# Patient Record
Sex: MALE | Race: WHITE | Employment: FULL TIME | URBAN - METROPOLITAN AREA
[De-identification: names, ages, dates, MRNs, and addresses within clinical notes are randomized per-mention and may not be internally consistent; named-entity substitution may affect disease eponyms.]

---

## 2018-11-03 ENCOUNTER — APPOINTMENT (OUTPATIENT)
Dept: CT IMAGING | Age: 58
DRG: 246 | End: 2018-11-03
Payer: COMMERCIAL

## 2018-11-03 ENCOUNTER — APPOINTMENT (OUTPATIENT)
Dept: GENERAL RADIOLOGY | Age: 58
DRG: 246 | End: 2018-11-03
Payer: COMMERCIAL

## 2018-11-03 ENCOUNTER — HOSPITAL ENCOUNTER (INPATIENT)
Age: 58
LOS: 7 days | Discharge: HOME | DRG: 246 | End: 2018-11-10
Attending: EMERGENCY MEDICINE | Admitting: INTERNAL MEDICINE
Payer: COMMERCIAL

## 2018-11-03 DIAGNOSIS — I46.9 CARDIAC ARREST (HCC): Primary | ICD-10-CM

## 2018-11-03 PROBLEM — I49.01 VENTRICULAR FIBRILLATION (HCC): Status: ACTIVE | Noted: 2018-11-03

## 2018-11-03 LAB
ALLEN TEST: ABNORMAL
ALLEN TEST: POSITIVE
ANION GAP: 11 MMOL/L (ref 7–16)
FIO2: 100
FIO2: ABNORMAL
GFR NON-AFRICAN AMERICAN: 40 ML/MIN
GFR SERPL CREATININE-BSD FRML MDRD: 48 ML/MIN
GFR SERPL CREATININE-BSD FRML MDRD: ABNORMAL ML/MIN/{1.73_M2}
GLUCOSE BLD-MCNC: 134 MG/DL (ref 74–100)
HCO3 VENOUS: 31.4 MMOL/L (ref 22–29)
MODE: ABNORMAL
MODE: ABNORMAL
NEGATIVE BASE EXCESS, ART: 6 (ref 0–2)
NEGATIVE BASE EXCESS, VEN: 1 (ref 0–2)
O2 DEVICE/FLOW/%: ABNORMAL
O2 DEVICE/FLOW/%: ABNORMAL
O2 SAT, VEN: 4 % (ref 60–85)
PATIENT TEMP: ABNORMAL
PATIENT TEMP: ABNORMAL
PCO2, VEN: 87.9 MM HG (ref 41–51)
PH VENOUS: 7.16 (ref 7.32–7.43)
PO2, VEN: 7.4 MM HG (ref 30–50)
POC CHLORIDE: 105 MMOL/L (ref 98–107)
POC CREATININE: 1.78 MG/DL (ref 0.51–1.19)
POC HCO3: 25.4 MMOL/L (ref 21–28)
POC HEMATOCRIT: 53 % (ref 41–53)
POC HEMOGLOBIN: 17.9 G/DL (ref 13.5–17.5)
POC IONIZED CALCIUM: 1.24 MMOL/L (ref 1.15–1.33)
POC LACTIC ACID: 6.4 MMOL/L (ref 0.56–1.39)
POC O2 SATURATION: 69 % (ref 94–98)
POC PCO2 TEMP: ABNORMAL MM HG
POC PCO2 TEMP: ABNORMAL MM HG
POC PCO2: 79 MM HG (ref 35–48)
POC PH TEMP: ABNORMAL
POC PH TEMP: ABNORMAL
POC PH: 7.11 (ref 7.35–7.45)
POC PO2 TEMP: ABNORMAL MM HG
POC PO2 TEMP: ABNORMAL MM HG
POC PO2: 49.8 MM HG (ref 83–108)
POC POTASSIUM: 3 MMOL/L (ref 3.5–4.5)
POC SODIUM: 147 MMOL/L (ref 138–146)
POSITIVE BASE EXCESS, ART: ABNORMAL (ref 0–3)
POSITIVE BASE EXCESS, VEN: ABNORMAL (ref 0–3)
SAMPLE SITE: ABNORMAL
SAMPLE SITE: ABNORMAL
TCO2 (CALC), ART: 28 MMOL/L (ref 22–29)
TOTAL CO2, VENOUS: 34 MMOL/L (ref 23–30)

## 2018-11-03 PROCEDURE — 93005 ELECTROCARDIOGRAM TRACING: CPT

## 2018-11-03 PROCEDURE — 84484 ASSAY OF TROPONIN QUANT: CPT

## 2018-11-03 PROCEDURE — 71045 X-RAY EXAM CHEST 1 VIEW: CPT

## 2018-11-03 PROCEDURE — 31500 INSERT EMERGENCY AIRWAY: CPT

## 2018-11-03 PROCEDURE — 84132 ASSAY OF SERUM POTASSIUM: CPT

## 2018-11-03 PROCEDURE — 82947 ASSAY GLUCOSE BLOOD QUANT: CPT

## 2018-11-03 PROCEDURE — 92941 PRQ TRLML REVSC TOT OCCL AMI: CPT | Performed by: INTERNAL MEDICINE

## 2018-11-03 PROCEDURE — 71260 CT THORAX DX C+: CPT

## 2018-11-03 PROCEDURE — 027236Z DILATION OF CORONARY ARTERY, THREE ARTERIES WITH THREE DRUG-ELUTING INTRALUMINAL DEVICES, PERCUTANEOUS APPROACH: ICD-10-PCS | Performed by: INTERNAL MEDICINE

## 2018-11-03 PROCEDURE — C1751 CATH, INF, PER/CENT/MIDLINE: HCPCS

## 2018-11-03 PROCEDURE — 94770 HC ETCO2 MONITOR DAILY: CPT

## 2018-11-03 PROCEDURE — 36600 WITHDRAWAL OF ARTERIAL BLOOD: CPT

## 2018-11-03 PROCEDURE — 82330 ASSAY OF CALCIUM: CPT

## 2018-11-03 PROCEDURE — 93458 L HRT ARTERY/VENTRICLE ANGIO: CPT | Performed by: INTERNAL MEDICINE

## 2018-11-03 PROCEDURE — 6370000000 HC RX 637 (ALT 250 FOR IP): Performed by: EMERGENCY MEDICINE

## 2018-11-03 PROCEDURE — 82435 ASSAY OF BLOOD CHLORIDE: CPT

## 2018-11-03 PROCEDURE — 2720000010 HC SURG SUPPLY STERILE

## 2018-11-03 PROCEDURE — 84295 ASSAY OF SERUM SODIUM: CPT

## 2018-11-03 PROCEDURE — 6360000004 HC RX CONTRAST MEDICATION: Performed by: EMERGENCY MEDICINE

## 2018-11-03 PROCEDURE — 83605 ASSAY OF LACTIC ACID: CPT

## 2018-11-03 PROCEDURE — 94762 N-INVAS EAR/PLS OXIMTRY CONT: CPT

## 2018-11-03 PROCEDURE — C1894 INTRO/SHEATH, NON-LASER: HCPCS

## 2018-11-03 PROCEDURE — C1769 GUIDE WIRE: HCPCS

## 2018-11-03 PROCEDURE — 92929 HC PRQ CARD STENT W/ANGIO ADDL: CPT | Performed by: INTERNAL MEDICINE

## 2018-11-03 PROCEDURE — 2709999900 HC NON-CHARGEABLE SUPPLY

## 2018-11-03 PROCEDURE — 82803 BLOOD GASES ANY COMBINATION: CPT

## 2018-11-03 PROCEDURE — C1725 CATH, TRANSLUMIN NON-LASER: HCPCS

## 2018-11-03 PROCEDURE — 99285 EMERGENCY DEPT VISIT HI MDM: CPT

## 2018-11-03 PROCEDURE — 2500000003 HC RX 250 WO HCPCS

## 2018-11-03 PROCEDURE — 92950 HEART/LUNG RESUSCITATION CPR: CPT

## 2018-11-03 PROCEDURE — 2000000000 HC ICU R&B

## 2018-11-03 PROCEDURE — 05H333Z INSERTION OF INFUSION DEVICE INTO RIGHT INNOMINATE VEIN, PERCUTANEOUS APPROACH: ICD-10-PCS | Performed by: INTERNAL MEDICINE

## 2018-11-03 PROCEDURE — 85014 HEMATOCRIT: CPT

## 2018-11-03 PROCEDURE — C1874 STENT, COATED/COV W/DEL SYS: HCPCS

## 2018-11-03 PROCEDURE — 6360000004 HC RX CONTRAST MEDICATION

## 2018-11-03 PROCEDURE — 6360000002 HC RX W HCPCS

## 2018-11-03 PROCEDURE — 36556 INSERT NON-TUNNEL CV CATH: CPT | Performed by: INTERNAL MEDICINE

## 2018-11-03 PROCEDURE — 94002 VENT MGMT INPAT INIT DAY: CPT

## 2018-11-03 PROCEDURE — 82565 ASSAY OF CREATININE: CPT

## 2018-11-03 PROCEDURE — C1887 CATHETER, GUIDING: HCPCS

## 2018-11-03 PROCEDURE — 80053 COMPREHEN METABOLIC PANEL: CPT

## 2018-11-03 PROCEDURE — 85025 COMPLETE CBC W/AUTO DIFF WBC: CPT

## 2018-11-03 RX ORDER — HEPARIN SODIUM 10000 [USP'U]/100ML
INJECTION, SOLUTION INTRAVENOUS
Status: DISPENSED
Start: 2018-11-03 | End: 2018-11-04

## 2018-11-03 RX ORDER — KETAMINE HYDROCHLORIDE 10 MG/ML
INJECTION, SOLUTION INTRAMUSCULAR; INTRAVENOUS
Status: DISPENSED
Start: 2018-11-03 | End: 2018-11-04

## 2018-11-03 RX ORDER — HEPARIN SODIUM 1000 [USP'U]/ML
INJECTION, SOLUTION INTRAVENOUS; SUBCUTANEOUS
Status: DISPENSED
Start: 2018-11-03 | End: 2018-11-04

## 2018-11-03 RX ORDER — ASPIRIN 300 MG/1
300 SUPPOSITORY RECTAL ONCE
Status: COMPLETED | OUTPATIENT
Start: 2018-11-03 | End: 2018-11-03

## 2018-11-03 RX ADMIN — ASPIRIN 300 MG: 300 SUPPOSITORY RECTAL at 23:30

## 2018-11-03 RX ADMIN — IOPAMIDOL 75 ML: 755 INJECTION, SOLUTION INTRAVENOUS at 23:23

## 2018-11-04 ENCOUNTER — APPOINTMENT (OUTPATIENT)
Dept: GENERAL RADIOLOGY | Age: 58
DRG: 246 | End: 2018-11-04
Payer: COMMERCIAL

## 2018-11-04 ENCOUNTER — APPOINTMENT (OUTPATIENT)
Dept: ULTRASOUND IMAGING | Age: 58
DRG: 246 | End: 2018-11-04
Payer: COMMERCIAL

## 2018-11-04 LAB
-: NORMAL
ABSOLUTE EOS #: 0 K/UL (ref 0–0.4)
ABSOLUTE IMMATURE GRANULOCYTE: 0.5 K/UL (ref 0–0.3)
ABSOLUTE LYMPH #: 5.04 K/UL (ref 1–4.8)
ABSOLUTE MONO #: 0.29 K/UL (ref 0.1–0.8)
ACTIVATED CLOTTING TIME: 225 SEC (ref 79–149)
ALBUMIN SERPL-MCNC: 3.7 G/DL (ref 3.5–5.2)
ALBUMIN/GLOBULIN RATIO: 1.3 (ref 1–2.5)
ALLEN TEST: ABNORMAL
ALP BLD-CCNC: 96 U/L (ref 40–129)
ALT SERPL-CCNC: 427 U/L (ref 5–41)
AMORPHOUS: NORMAL
ANION GAP SERPL CALCULATED.3IONS-SCNC: 12 MMOL/L (ref 9–17)
ANION GAP SERPL CALCULATED.3IONS-SCNC: 14 MMOL/L (ref 9–17)
ANION GAP SERPL CALCULATED.3IONS-SCNC: 14 MMOL/L (ref 9–17)
ANION GAP SERPL CALCULATED.3IONS-SCNC: 27 MMOL/L (ref 9–17)
ANION GAP: 11 MMOL/L (ref 7–16)
ANION GAP: 8 MMOL/L (ref 7–16)
AST SERPL-CCNC: 315 U/L
BACTERIA: NORMAL
BASOPHILS # BLD: 0 % (ref 0–2)
BASOPHILS ABSOLUTE: 0 K/UL (ref 0–0.2)
BILIRUB SERPL-MCNC: 0.57 MG/DL (ref 0.3–1.2)
BILIRUBIN URINE: NEGATIVE
BUN BLDV-MCNC: 22 MG/DL (ref 6–20)
BUN BLDV-MCNC: 28 MG/DL (ref 6–20)
BUN BLDV-MCNC: 32 MG/DL (ref 6–20)
BUN BLDV-MCNC: 33 MG/DL (ref 6–20)
BUN/CREAT BLD: ABNORMAL (ref 9–20)
CALCIUM IONIZED: 1.13 MMOL/L (ref 1.13–1.33)
CALCIUM IONIZED: 1.16 MMOL/L (ref 1.13–1.33)
CALCIUM IONIZED: 1.2 MMOL/L (ref 1.13–1.33)
CALCIUM SERPL-MCNC: 10.2 MG/DL (ref 8.6–10.4)
CALCIUM SERPL-MCNC: 8.8 MG/DL (ref 8.6–10.4)
CALCIUM SERPL-MCNC: 8.9 MG/DL (ref 8.6–10.4)
CALCIUM SERPL-MCNC: 9 MG/DL (ref 8.6–10.4)
CASTS UA: NORMAL /LPF (ref 0–2)
CHLORIDE BLD-SCNC: 102 MMOL/L (ref 98–107)
CHLORIDE BLD-SCNC: 107 MMOL/L (ref 98–107)
CHLORIDE BLD-SCNC: 96 MMOL/L (ref 98–107)
CHLORIDE BLD-SCNC: 99 MMOL/L (ref 98–107)
CHOLESTEROL/HDL RATIO: 4.8
CHOLESTEROL: 192 MG/DL
CO2: 20 MMOL/L (ref 20–31)
CO2: 21 MMOL/L (ref 20–31)
CO2: 22 MMOL/L (ref 20–31)
CO2: 23 MMOL/L (ref 20–31)
COLOR: YELLOW
COMMENT UA: ABNORMAL
CREAT SERPL-MCNC: 1.55 MG/DL (ref 0.7–1.2)
CREAT SERPL-MCNC: 1.59 MG/DL (ref 0.7–1.2)
CREAT SERPL-MCNC: 1.71 MG/DL (ref 0.7–1.2)
CREAT SERPL-MCNC: 2.01 MG/DL (ref 0.7–1.2)
CRYSTALS, UA: NORMAL /HPF
DIFFERENTIAL TYPE: ABNORMAL
EOSINOPHILS RELATIVE PERCENT: 0 % (ref 1–4)
EPITHELIAL CELLS UA: NORMAL /HPF (ref 0–5)
FIO2: 100
FIO2: 50
FIO2: 80
FIO2: ABNORMAL
GFR AFRICAN AMERICAN: 42 ML/MIN
GFR AFRICAN AMERICAN: 50 ML/MIN
GFR AFRICAN AMERICAN: 55 ML/MIN
GFR AFRICAN AMERICAN: 56 ML/MIN
GFR NON-AFRICAN AMERICAN: 35 ML/MIN
GFR NON-AFRICAN AMERICAN: 41 ML/MIN
GFR NON-AFRICAN AMERICAN: 42 ML/MIN
GFR NON-AFRICAN AMERICAN: 45 ML/MIN
GFR NON-AFRICAN AMERICAN: 47 ML/MIN
GFR NON-AFRICAN AMERICAN: 47 ML/MIN
GFR SERPL CREATININE-BSD FRML MDRD: 50 ML/MIN
GFR SERPL CREATININE-BSD FRML MDRD: 57 ML/MIN
GFR SERPL CREATININE-BSD FRML MDRD: ABNORMAL ML/MIN/{1.73_M2}
GLUCOSE BLD-MCNC: 105 MG/DL (ref 75–110)
GLUCOSE BLD-MCNC: 118 MG/DL (ref 75–110)
GLUCOSE BLD-MCNC: 129 MG/DL (ref 75–110)
GLUCOSE BLD-MCNC: 137 MG/DL (ref 70–99)
GLUCOSE BLD-MCNC: 146 MG/DL (ref 75–110)
GLUCOSE BLD-MCNC: 148 MG/DL (ref 70–99)
GLUCOSE BLD-MCNC: 152 MG/DL (ref 74–100)
GLUCOSE BLD-MCNC: 153 MG/DL (ref 74–100)
GLUCOSE BLD-MCNC: 165 MG/DL (ref 74–100)
GLUCOSE BLD-MCNC: 166 MG/DL (ref 70–99)
GLUCOSE BLD-MCNC: 176 MG/DL (ref 74–100)
GLUCOSE BLD-MCNC: 243 MG/DL (ref 70–99)
GLUCOSE URINE: ABNORMAL
HCT VFR BLD CALC: 41.2 % (ref 40.7–50.3)
HCT VFR BLD CALC: 41.7 % (ref 40.7–50.3)
HCT VFR BLD CALC: 44.9 % (ref 40.7–50.3)
HCT VFR BLD CALC: 46.7 % (ref 40.7–50.3)
HDLC SERPL-MCNC: 40 MG/DL
HEMOGLOBIN: 13.6 G/DL (ref 13–17)
HEMOGLOBIN: 13.6 G/DL (ref 13–17)
HEMOGLOBIN: 14.5 G/DL (ref 13–17)
HEMOGLOBIN: 14.6 G/DL (ref 13–17)
IMMATURE GRANULOCYTES: 7 %
INR BLD: 1.1
KETONES, URINE: NEGATIVE
LACTIC ACID, WHOLE BLOOD: 1.2 MMOL/L (ref 0.7–2.1)
LACTIC ACID: NORMAL MMOL/L
LACTIC ACID: NORMAL MMOL/L
LDL CHOLESTEROL: 139 MG/DL (ref 0–130)
LEUKOCYTE ESTERASE, URINE: NEGATIVE
LV EF: 13 %
LVEF MODALITY: NORMAL
LYMPHOCYTES # BLD: 70 % (ref 24–44)
MAGNESIUM: 2.4 MG/DL (ref 1.6–2.6)
MAGNESIUM: 2.4 MG/DL (ref 1.6–2.6)
MAGNESIUM: 2.5 MG/DL (ref 1.6–2.6)
MAGNESIUM: 2.5 MG/DL (ref 1.6–2.6)
MAGNESIUM: 2.7 MG/DL (ref 1.6–2.6)
MCH RBC QN AUTO: 28.2 PG (ref 25.2–33.5)
MCH RBC QN AUTO: 28.9 PG (ref 25.2–33.5)
MCH RBC QN AUTO: 29 PG (ref 25.2–33.5)
MCHC RBC AUTO-ENTMCNC: 31.3 G/DL (ref 28.4–34.8)
MCHC RBC AUTO-ENTMCNC: 32.3 G/DL (ref 28.4–34.8)
MCHC RBC AUTO-ENTMCNC: 33 G/DL (ref 28.4–34.8)
MCV RBC AUTO: 87.2 FL (ref 82.6–102.9)
MCV RBC AUTO: 87.7 FL (ref 82.6–102.9)
MCV RBC AUTO: 92.8 FL (ref 82.6–102.9)
MODE: ABNORMAL
MONOCYTES # BLD: 4 % (ref 1–7)
MORPHOLOGY: NORMAL
MUCUS: NORMAL
NEGATIVE BASE EXCESS, ART: 1 (ref 0–2)
NEGATIVE BASE EXCESS, ART: 2 (ref 0–2)
NEGATIVE BASE EXCESS, ART: 3 (ref 0–2)
NEGATIVE BASE EXCESS, ART: 3 (ref 0–2)
NITRITE, URINE: NEGATIVE
NRBC AUTOMATED: 0 PER 100 WBC
NRBC AUTOMATED: 0 PER 100 WBC
NRBC AUTOMATED: 0.8 PER 100 WBC
O2 DEVICE/FLOW/%: ABNORMAL
OTHER OBSERVATIONS UA: NORMAL
PARTIAL THROMBOPLASTIN TIME: 90.5 SEC (ref 20.5–30.5)
PATIENT TEMP: 33.1
PATIENT TEMP: ABNORMAL
PDW BLD-RTO: 12.6 % (ref 11.8–14.4)
PDW BLD-RTO: 12.8 % (ref 11.8–14.4)
PDW BLD-RTO: 13.1 % (ref 11.8–14.4)
PH UA: 7.5 (ref 5–8)
PHOSPHORUS: 2.9 MG/DL (ref 2.5–4.5)
PHOSPHORUS: 2.9 MG/DL (ref 2.5–4.5)
PHOSPHORUS: 3.4 MG/DL (ref 2.5–4.5)
PHOSPHORUS: 3.7 MG/DL (ref 2.5–4.5)
PHOSPHORUS: 5.2 MG/DL (ref 2.5–4.5)
PLATELET # BLD: 204 K/UL (ref 138–453)
PLATELET # BLD: 222 K/UL (ref 138–453)
PLATELET # BLD: 247 K/UL (ref 138–453)
PLATELET ESTIMATE: ABNORMAL
PMV BLD AUTO: 10.5 FL (ref 8.1–13.5)
PMV BLD AUTO: 9.9 FL (ref 8.1–13.5)
PMV BLD AUTO: 9.9 FL (ref 8.1–13.5)
POC CHLORIDE: 103 MMOL/L (ref 98–107)
POC CHLORIDE: 104 MMOL/L (ref 98–107)
POC CREATININE: 1.53 MG/DL (ref 0.51–1.19)
POC CREATININE: 1.72 MG/DL (ref 0.51–1.19)
POC HCO3: 23.6 MMOL/L (ref 21–28)
POC HCO3: 24 MMOL/L (ref 21–28)
POC HCO3: 24.7 MMOL/L (ref 21–28)
POC HCO3: 24.7 MMOL/L (ref 21–28)
POC HCO3: 24.8 MMOL/L (ref 21–28)
POC HCO3: 25.3 MMOL/L (ref 21–28)
POC HEMATOCRIT: 40 % (ref 41–53)
POC HEMATOCRIT: 41 % (ref 41–53)
POC HEMOGLOBIN: 13.6 G/DL (ref 13.5–17.5)
POC HEMOGLOBIN: 14 G/DL (ref 13.5–17.5)
POC IONIZED CALCIUM: 1.18 MMOL/L (ref 1.15–1.33)
POC IONIZED CALCIUM: 1.24 MMOL/L (ref 1.15–1.33)
POC LACTIC ACID: 2.09 MMOL/L (ref 0.56–1.39)
POC LACTIC ACID: 2.48 MMOL/L (ref 0.56–1.39)
POC O2 SATURATION: 92 % (ref 94–98)
POC O2 SATURATION: 94 % (ref 94–98)
POC O2 SATURATION: 95 % (ref 94–98)
POC O2 SATURATION: 96 % (ref 94–98)
POC O2 SATURATION: 98 % (ref 94–98)
POC O2 SATURATION: 99 % (ref 94–98)
POC PCO2 TEMP: 39 MM HG
POC PCO2 TEMP: 42 MM HG
POC PCO2 TEMP: 42 MM HG
POC PCO2 TEMP: ABNORMAL MM HG
POC PCO2: 46 MM HG (ref 35–48)
POC PCO2: 46 MM HG (ref 35–48)
POC PCO2: 46.2 MM HG (ref 35–48)
POC PCO2: 49.1 MM HG (ref 35–48)
POC PCO2: 49.4 MM HG (ref 35–48)
POC PCO2: 49.6 MM HG (ref 35–48)
POC PH TEMP: 7.35
POC PH TEMP: 7.36
POC PH TEMP: 7.37
POC PH TEMP: ABNORMAL
POC PH: 7.29 (ref 7.35–7.45)
POC PH: 7.3 (ref 7.35–7.45)
POC PH: 7.31 (ref 7.35–7.45)
POC PH: 7.32 (ref 7.35–7.45)
POC PH: 7.34 (ref 7.35–7.45)
POC PH: 7.35 (ref 7.35–7.45)
POC PO2 TEMP: 127 MM HG
POC PO2 TEMP: 68 MM HG
POC PO2 TEMP: 85 MM HG
POC PO2 TEMP: ABNORMAL MM HG
POC PO2: 105.7 MM HG (ref 83–108)
POC PO2: 148.6 MM HG (ref 83–108)
POC PO2: 70.6 MM HG (ref 83–108)
POC PO2: 75.3 MM HG (ref 83–108)
POC PO2: 86 MM HG (ref 83–108)
POC PO2: 86.9 MM HG (ref 83–108)
POC POTASSIUM: 3.3 MMOL/L (ref 3.5–4.5)
POC POTASSIUM: 3.4 MMOL/L (ref 3.5–4.5)
POC SODIUM: 137 MMOL/L (ref 138–146)
POC SODIUM: 139 MMOL/L (ref 138–146)
POSITIVE BASE EXCESS, ART: ABNORMAL (ref 0–3)
POTASSIUM SERPL-SCNC: 3.3 MMOL/L (ref 3.7–5.3)
POTASSIUM SERPL-SCNC: 3.4 MMOL/L (ref 3.7–5.3)
POTASSIUM SERPL-SCNC: 3.4 MMOL/L (ref 3.7–5.3)
POTASSIUM SERPL-SCNC: 3.6 MMOL/L (ref 3.7–5.3)
POTASSIUM SERPL-SCNC: 3.9 MMOL/L (ref 3.7–5.3)
POTASSIUM SERPL-SCNC: 3.9 MMOL/L (ref 3.7–5.3)
PROTEIN UA: ABNORMAL
PROTHROMBIN TIME: 11.9 SEC (ref 9–12)
RBC # BLD: 4.7 M/UL (ref 4.21–5.77)
RBC # BLD: 5.03 M/UL (ref 4.21–5.77)
RBC # BLD: 5.15 M/UL (ref 4.21–5.77)
RBC # BLD: ABNORMAL 10*6/UL
RBC UA: NORMAL /HPF (ref 0–2)
RENAL EPITHELIAL, UA: NORMAL /HPF
SAMPLE SITE: ABNORMAL
SEG NEUTROPHILS: 19 % (ref 36–66)
SEGMENTED NEUTROPHILS ABSOLUTE COUNT: 1.37 K/UL (ref 1.8–7.7)
SODIUM BLD-SCNC: 136 MMOL/L (ref 135–144)
SODIUM BLD-SCNC: 138 MMOL/L (ref 135–144)
SODIUM BLD-SCNC: 140 MMOL/L (ref 135–144)
SODIUM BLD-SCNC: 143 MMOL/L (ref 135–144)
SPECIFIC GRAVITY UA: 1.03 (ref 1–1.03)
TCO2 (CALC), ART: 25 MMOL/L (ref 22–29)
TCO2 (CALC), ART: 26 MMOL/L (ref 22–29)
TCO2 (CALC), ART: 27 MMOL/L (ref 22–29)
TOTAL PROTEIN: 6.5 G/DL (ref 6.4–8.3)
TRICHOMONAS: NORMAL
TRIGL SERPL-MCNC: 67 MG/DL
TROPONIN INTERP: ABNORMAL
TROPONIN T: 0.05 NG/ML
TROPONIN T: 3.2 NG/ML
TROPONIN T: 4.66 NG/ML
TURBIDITY: ABNORMAL
URINE HGB: ABNORMAL
UROBILINOGEN, URINE: NORMAL
VLDLC SERPL CALC-MCNC: ABNORMAL MG/DL (ref 1–30)
WBC # BLD: 14.8 K/UL (ref 3.5–11.3)
WBC # BLD: 15.8 K/UL (ref 3.5–11.3)
WBC # BLD: 7.2 K/UL (ref 3.5–11.3)
WBC # BLD: ABNORMAL 10*3/UL
WBC UA: NORMAL /HPF (ref 0–5)
YEAST: NORMAL

## 2018-11-04 PROCEDURE — 82803 BLOOD GASES ANY COMBINATION: CPT

## 2018-11-04 PROCEDURE — 2580000003 HC RX 258: Performed by: INTERNAL MEDICINE

## 2018-11-04 PROCEDURE — 92941 PRQ TRLML REVSC TOT OCCL AMI: CPT | Performed by: INTERNAL MEDICINE

## 2018-11-04 PROCEDURE — 85347 COAGULATION TIME ACTIVATED: CPT

## 2018-11-04 PROCEDURE — 36556 INSERT NON-TUNNEL CV CATH: CPT | Performed by: INTERNAL MEDICINE

## 2018-11-04 PROCEDURE — 85018 HEMOGLOBIN: CPT

## 2018-11-04 PROCEDURE — 87086 URINE CULTURE/COLONY COUNT: CPT

## 2018-11-04 PROCEDURE — 85610 PROTHROMBIN TIME: CPT

## 2018-11-04 PROCEDURE — 6370000000 HC RX 637 (ALT 250 FOR IP): Performed by: INTERNAL MEDICINE

## 2018-11-04 PROCEDURE — 6360000002 HC RX W HCPCS: Performed by: INTERNAL MEDICINE

## 2018-11-04 PROCEDURE — 82565 ASSAY OF CREATININE: CPT

## 2018-11-04 PROCEDURE — 71045 X-RAY EXAM CHEST 1 VIEW: CPT

## 2018-11-04 PROCEDURE — 0BH18EZ INSERTION OF ENDOTRACHEAL AIRWAY INTO TRACHEA, VIA NATURAL OR ARTIFICIAL OPENING ENDOSCOPIC: ICD-10-PCS | Performed by: EMERGENCY MEDICINE

## 2018-11-04 PROCEDURE — C9113 INJ PANTOPRAZOLE SODIUM, VIA: HCPCS | Performed by: INTERNAL MEDICINE

## 2018-11-04 PROCEDURE — B2111ZZ FLUOROSCOPY OF MULTIPLE CORONARY ARTERIES USING LOW OSMOLAR CONTRAST: ICD-10-PCS | Performed by: INTERNAL MEDICINE

## 2018-11-04 PROCEDURE — 2720000010 HC SURG SUPPLY STERILE

## 2018-11-04 PROCEDURE — 83735 ASSAY OF MAGNESIUM: CPT

## 2018-11-04 PROCEDURE — 85014 HEMATOCRIT: CPT

## 2018-11-04 PROCEDURE — 83605 ASSAY OF LACTIC ACID: CPT

## 2018-11-04 PROCEDURE — 4A023N8 MEASUREMENT OF CARDIAC SAMPLING AND PRESSURE, BILATERAL, PERCUTANEOUS APPROACH: ICD-10-PCS | Performed by: INTERNAL MEDICINE

## 2018-11-04 PROCEDURE — 94770 HC ETCO2 MONITOR DAILY: CPT

## 2018-11-04 PROCEDURE — 84100 ASSAY OF PHOSPHORUS: CPT

## 2018-11-04 PROCEDURE — C1769 GUIDE WIRE: HCPCS

## 2018-11-04 PROCEDURE — 82330 ASSAY OF CALCIUM: CPT

## 2018-11-04 PROCEDURE — 85730 THROMBOPLASTIN TIME PARTIAL: CPT

## 2018-11-04 PROCEDURE — 74018 RADEX ABDOMEN 1 VIEW: CPT

## 2018-11-04 PROCEDURE — 89220 SPUTUM SPECIMEN COLLECTION: CPT

## 2018-11-04 PROCEDURE — 85027 COMPLETE CBC AUTOMATED: CPT

## 2018-11-04 PROCEDURE — 80048 BASIC METABOLIC PNL TOTAL CA: CPT

## 2018-11-04 PROCEDURE — 80061 LIPID PANEL: CPT

## 2018-11-04 PROCEDURE — 2700000000 HC OXYGEN THERAPY PER DAY

## 2018-11-04 PROCEDURE — 84300 ASSAY OF URINE SODIUM: CPT

## 2018-11-04 PROCEDURE — 6370000000 HC RX 637 (ALT 250 FOR IP)

## 2018-11-04 PROCEDURE — 84484 ASSAY OF TROPONIN QUANT: CPT

## 2018-11-04 PROCEDURE — 2500000003 HC RX 250 WO HCPCS: Performed by: INTERNAL MEDICINE

## 2018-11-04 PROCEDURE — 2000000000 HC ICU R&B

## 2018-11-04 PROCEDURE — 81001 URINALYSIS AUTO W/SCOPE: CPT

## 2018-11-04 PROCEDURE — 83935 ASSAY OF URINE OSMOLALITY: CPT

## 2018-11-04 PROCEDURE — 6360000002 HC RX W HCPCS

## 2018-11-04 PROCEDURE — 87070 CULTURE OTHR SPECIMN AEROBIC: CPT

## 2018-11-04 PROCEDURE — C8929 TTE W OR WO FOL WCON,DOPPLER: HCPCS

## 2018-11-04 PROCEDURE — 87205 SMEAR GRAM STAIN: CPT

## 2018-11-04 PROCEDURE — 92929 HC PRQ CARD STENT W/ANGIO ADDL: CPT | Performed by: INTERNAL MEDICINE

## 2018-11-04 PROCEDURE — 94003 VENT MGMT INPAT SUBQ DAY: CPT

## 2018-11-04 PROCEDURE — 84295 ASSAY OF SERUM SODIUM: CPT

## 2018-11-04 PROCEDURE — 93458 L HRT ARTERY/VENTRICLE ANGIO: CPT | Performed by: INTERNAL MEDICINE

## 2018-11-04 PROCEDURE — 84132 ASSAY OF SERUM POTASSIUM: CPT

## 2018-11-04 PROCEDURE — 82435 ASSAY OF BLOOD CHLORIDE: CPT

## 2018-11-04 PROCEDURE — C1874 STENT, COATED/COV W/DEL SYS: HCPCS

## 2018-11-04 PROCEDURE — 76770 US EXAM ABDO BACK WALL COMP: CPT

## 2018-11-04 PROCEDURE — 94762 N-INVAS EAR/PLS OXIMTRY CONT: CPT

## 2018-11-04 PROCEDURE — 82947 ASSAY GLUCOSE BLOOD QUANT: CPT

## 2018-11-04 PROCEDURE — B2151ZZ FLUOROSCOPY OF LEFT HEART USING LOW OSMOLAR CONTRAST: ICD-10-PCS | Performed by: INTERNAL MEDICINE

## 2018-11-04 PROCEDURE — 36415 COLL VENOUS BLD VENIPUNCTURE: CPT

## 2018-11-04 PROCEDURE — 2709999900 HC NON-CHARGEABLE SUPPLY

## 2018-11-04 PROCEDURE — 5A1945Z RESPIRATORY VENTILATION, 24-96 CONSECUTIVE HOURS: ICD-10-PCS | Performed by: EMERGENCY MEDICINE

## 2018-11-04 PROCEDURE — 99291 CRITICAL CARE FIRST HOUR: CPT | Performed by: INTERNAL MEDICINE

## 2018-11-04 PROCEDURE — 2500000003 HC RX 250 WO HCPCS

## 2018-11-04 PROCEDURE — 93005 ELECTROCARDIOGRAM TRACING: CPT

## 2018-11-04 RX ORDER — LOSARTAN POTASSIUM AND HYDROCHLOROTHIAZIDE 25; 100 MG/1; MG/1
1 TABLET ORAL DAILY
Status: ON HOLD | COMMUNITY
End: 2018-11-10 | Stop reason: HOSPADM

## 2018-11-04 RX ORDER — NICOTINE POLACRILEX 4 MG
15 LOZENGE BUCCAL PRN
Status: DISCONTINUED | OUTPATIENT
Start: 2018-11-04 | End: 2018-11-10 | Stop reason: HOSPADM

## 2018-11-04 RX ORDER — CARVEDILOL 25 MG/1
25 TABLET ORAL 2 TIMES DAILY WITH MEALS
COMMUNITY

## 2018-11-04 RX ORDER — SODIUM CHLORIDE 0.9 % (FLUSH) 0.9 %
10 SYRINGE (ML) INJECTION EVERY 12 HOURS SCHEDULED
Status: CANCELLED | OUTPATIENT
Start: 2018-11-04

## 2018-11-04 RX ORDER — DOCUSATE SODIUM 100 MG/1
100 CAPSULE, LIQUID FILLED ORAL 2 TIMES DAILY PRN
Status: DISCONTINUED | OUTPATIENT
Start: 2018-11-04 | End: 2018-11-10 | Stop reason: HOSPADM

## 2018-11-04 RX ORDER — SODIUM CHLORIDE 0.9 % (FLUSH) 0.9 %
10 SYRINGE (ML) INJECTION PRN
Status: CANCELLED | OUTPATIENT
Start: 2018-11-03

## 2018-11-04 RX ORDER — CHLORHEXIDINE GLUCONATE 0.12 MG/ML
15 RINSE ORAL 2 TIMES DAILY
Status: DISCONTINUED | OUTPATIENT
Start: 2018-11-04 | End: 2018-11-06

## 2018-11-04 RX ORDER — SODIUM CHLORIDE 0.9 % (FLUSH) 0.9 %
10 SYRINGE (ML) INJECTION PRN
Status: DISCONTINUED | OUTPATIENT
Start: 2018-11-04 | End: 2018-11-10 | Stop reason: HOSPADM

## 2018-11-04 RX ORDER — CYANOCOBALAMIN (VITAMIN B-12) 1000 MCG
1 TABLET ORAL DAILY
COMMUNITY
End: 2018-12-03

## 2018-11-04 RX ORDER — ONDANSETRON 2 MG/ML
4 INJECTION INTRAMUSCULAR; INTRAVENOUS EVERY 6 HOURS PRN
Status: DISCONTINUED | OUTPATIENT
Start: 2018-11-04 | End: 2018-11-10 | Stop reason: HOSPADM

## 2018-11-04 RX ORDER — UREA 10 %
800 LOTION (ML) TOPICAL DAILY
COMMUNITY
End: 2018-12-03

## 2018-11-04 RX ORDER — CHLORHEXIDINE GLUCONATE 0.12 MG/ML
15 RINSE ORAL 2 TIMES DAILY
Status: DISCONTINUED | OUTPATIENT
Start: 2018-11-04 | End: 2018-11-04

## 2018-11-04 RX ORDER — DEXTROSE MONOHYDRATE 50 MG/ML
100 INJECTION, SOLUTION INTRAVENOUS PRN
Status: DISCONTINUED | OUTPATIENT
Start: 2018-11-04 | End: 2018-11-10 | Stop reason: HOSPADM

## 2018-11-04 RX ORDER — PANTOPRAZOLE SODIUM 40 MG/10ML
40 INJECTION, POWDER, LYOPHILIZED, FOR SOLUTION INTRAVENOUS DAILY
Status: DISCONTINUED | OUTPATIENT
Start: 2018-11-04 | End: 2018-11-10 | Stop reason: HOSPADM

## 2018-11-04 RX ORDER — DEXTROSE MONOHYDRATE 25 G/50ML
12.5 INJECTION, SOLUTION INTRAVENOUS PRN
Status: DISCONTINUED | OUTPATIENT
Start: 2018-11-04 | End: 2018-11-10 | Stop reason: HOSPADM

## 2018-11-04 RX ORDER — PROPOFOL 10 MG/ML
10 INJECTION, EMULSION INTRAVENOUS
Status: DISCONTINUED | OUTPATIENT
Start: 2018-11-04 | End: 2018-11-06

## 2018-11-04 RX ORDER — DOCUSATE SODIUM 100 MG/1
100 CAPSULE, LIQUID FILLED ORAL 2 TIMES DAILY PRN
Status: CANCELLED | OUTPATIENT
Start: 2018-11-03

## 2018-11-04 RX ORDER — RIBOFLAVIN (VITAMIN B2) 100 MG
1 TABLET ORAL DAILY
COMMUNITY
End: 2018-12-03

## 2018-11-04 RX ORDER — ONDANSETRON 2 MG/ML
4 INJECTION INTRAMUSCULAR; INTRAVENOUS EVERY 6 HOURS PRN
Status: CANCELLED | OUTPATIENT
Start: 2018-11-03

## 2018-11-04 RX ORDER — ATORVASTATIN CALCIUM 80 MG/1
80 TABLET, FILM COATED ORAL NIGHTLY
Status: DISCONTINUED | OUTPATIENT
Start: 2018-11-04 | End: 2018-11-10 | Stop reason: HOSPADM

## 2018-11-04 RX ORDER — RANITIDINE 150 MG/1
150 TABLET ORAL 2 TIMES DAILY PRN
Status: ON HOLD | COMMUNITY
End: 2018-11-10 | Stop reason: HOSPADM

## 2018-11-04 RX ORDER — FENTANYL CITRATE 50 UG/ML
25 INJECTION, SOLUTION INTRAMUSCULAR; INTRAVENOUS
Status: DISCONTINUED | OUTPATIENT
Start: 2018-11-04 | End: 2018-11-10 | Stop reason: HOSPADM

## 2018-11-04 RX ORDER — SODIUM CHLORIDE 0.9 % (FLUSH) 0.9 %
10 SYRINGE (ML) INJECTION EVERY 12 HOURS SCHEDULED
Status: DISCONTINUED | OUTPATIENT
Start: 2018-11-04 | End: 2018-11-10 | Stop reason: HOSPADM

## 2018-11-04 RX ORDER — 0.9 % SODIUM CHLORIDE 0.9 %
10 VIAL (ML) INJECTION DAILY
Status: DISCONTINUED | OUTPATIENT
Start: 2018-11-04 | End: 2018-11-10 | Stop reason: HOSPADM

## 2018-11-04 RX ORDER — AMLODIPINE BESYLATE 10 MG/1
10 TABLET ORAL DAILY
COMMUNITY

## 2018-11-04 RX ORDER — ASPIRIN 81 MG/1
81 TABLET ORAL DAILY
COMMUNITY

## 2018-11-04 RX ORDER — SODIUM CHLORIDE 9 MG/ML
INJECTION, SOLUTION INTRAVENOUS CONTINUOUS
Status: DISCONTINUED | OUTPATIENT
Start: 2018-11-04 | End: 2018-11-06

## 2018-11-04 RX ORDER — MAGNESIUM SULFATE 1 G/100ML
1 INJECTION INTRAVENOUS PRN
Status: DISCONTINUED | OUTPATIENT
Start: 2018-11-04 | End: 2018-11-10 | Stop reason: HOSPADM

## 2018-11-04 RX ORDER — ACETAMINOPHEN 325 MG/1
650 TABLET ORAL EVERY 4 HOURS PRN
Status: CANCELLED | OUTPATIENT
Start: 2018-11-03

## 2018-11-04 RX ORDER — ASPIRIN 81 MG/1
81 TABLET, CHEWABLE ORAL DAILY
Status: DISCONTINUED | OUTPATIENT
Start: 2018-11-05 | End: 2018-11-10 | Stop reason: HOSPADM

## 2018-11-04 RX ORDER — POTASSIUM CHLORIDE 29.8 MG/ML
20 INJECTION INTRAVENOUS PRN
Status: DISCONTINUED | OUTPATIENT
Start: 2018-11-04 | End: 2018-11-06

## 2018-11-04 RX ORDER — PANTOPRAZOLE SODIUM 40 MG/1
40 TABLET, DELAYED RELEASE ORAL
Status: DISCONTINUED | OUTPATIENT
Start: 2018-11-04 | End: 2018-11-04

## 2018-11-04 RX ORDER — ACETAMINOPHEN 325 MG/1
650 TABLET ORAL EVERY 4 HOURS PRN
Status: DISCONTINUED | OUTPATIENT
Start: 2018-11-04 | End: 2018-11-10 | Stop reason: HOSPADM

## 2018-11-04 RX ADMIN — POTASSIUM CHLORIDE 20 MEQ: 29.8 INJECTION, SOLUTION INTRAVENOUS at 01:40

## 2018-11-04 RX ADMIN — FENTANYL CITRATE 25 MCG: 50 INJECTION INTRAMUSCULAR; INTRAVENOUS at 22:03

## 2018-11-04 RX ADMIN — SODIUM CHLORIDE: 9 INJECTION, SOLUTION INTRAVENOUS at 01:29

## 2018-11-04 RX ADMIN — Medication 4 MG/HR: at 03:44

## 2018-11-04 RX ADMIN — Medication 10 ML: at 19:54

## 2018-11-04 RX ADMIN — FENTANYL CITRATE 25 MCG: 50 INJECTION INTRAMUSCULAR; INTRAVENOUS at 04:02

## 2018-11-04 RX ADMIN — CHLORHEXIDINE GLUCONATE 0.12% ORAL RINSE 15 ML: 1.2 LIQUID ORAL at 08:28

## 2018-11-04 RX ADMIN — FENTANYL CITRATE 25 MCG: 50 INJECTION INTRAMUSCULAR; INTRAVENOUS at 14:14

## 2018-11-04 RX ADMIN — POTASSIUM CHLORIDE 20 MEQ: 29.8 INJECTION, SOLUTION INTRAVENOUS at 04:34

## 2018-11-04 RX ADMIN — Medication 10 ML: at 08:29

## 2018-11-04 RX ADMIN — CHLORHEXIDINE GLUCONATE 0.12% ORAL RINSE 15 ML: 1.2 LIQUID ORAL at 19:51

## 2018-11-04 RX ADMIN — FENTANYL CITRATE 25 MCG: 50 INJECTION INTRAMUSCULAR; INTRAVENOUS at 23:17

## 2018-11-04 RX ADMIN — POTASSIUM CHLORIDE 20 MEQ: 29.8 INJECTION, SOLUTION INTRAVENOUS at 08:53

## 2018-11-04 RX ADMIN — Medication 7 MG/HR: at 13:49

## 2018-11-04 RX ADMIN — HYDROCORTISONE SODIUM SUCCINATE 100 MG: 100 INJECTION, POWDER, FOR SOLUTION INTRAMUSCULAR; INTRAVENOUS at 03:57

## 2018-11-04 RX ADMIN — TICAGRELOR 90 MG: 90 TABLET ORAL at 08:28

## 2018-11-04 RX ADMIN — FENTANYL CITRATE 25 MCG: 50 INJECTION INTRAMUSCULAR; INTRAVENOUS at 01:24

## 2018-11-04 RX ADMIN — Medication 2 MCG/MIN: at 03:40

## 2018-11-04 RX ADMIN — PROPOFOL 20 MCG/KG/MIN: 10 INJECTION, EMULSION INTRAVENOUS at 00:30

## 2018-11-04 RX ADMIN — CHLORHEXIDINE GLUCONATE 0.12% ORAL RINSE 15 ML: 1.2 LIQUID ORAL at 01:40

## 2018-11-04 RX ADMIN — CISATRACURIUM BESYLATE 2 MCG/KG/MIN: 10 INJECTION, SOLUTION INTRAVENOUS at 01:20

## 2018-11-04 RX ADMIN — Medication 10 ML: at 09:00

## 2018-11-04 RX ADMIN — TICAGRELOR 90 MG: 90 TABLET ORAL at 19:51

## 2018-11-04 RX ADMIN — POTASSIUM CHLORIDE 20 MEQ: 29.8 INJECTION, SOLUTION INTRAVENOUS at 05:39

## 2018-11-04 RX ADMIN — INSULIN LISPRO 1 UNITS: 100 INJECTION, SOLUTION INTRAVENOUS; SUBCUTANEOUS at 08:35

## 2018-11-04 RX ADMIN — FENTANYL CITRATE 25 MCG: 50 INJECTION INTRAMUSCULAR; INTRAVENOUS at 17:46

## 2018-11-04 RX ADMIN — PANTOPRAZOLE SODIUM 40 MG: 40 INJECTION, POWDER, FOR SOLUTION INTRAVENOUS at 09:00

## 2018-11-04 RX ADMIN — POTASSIUM CHLORIDE 20 MEQ: 29.8 INJECTION, SOLUTION INTRAVENOUS at 18:00

## 2018-11-04 RX ADMIN — FENTANYL CITRATE 25 MCG: 50 INJECTION INTRAMUSCULAR; INTRAVENOUS at 09:18

## 2018-11-04 RX ADMIN — ATORVASTATIN CALCIUM 80 MG: 80 TABLET, FILM COATED ORAL at 19:51

## 2018-11-04 ASSESSMENT — PULMONARY FUNCTION TESTS
PIF_VALUE: 25
PIF_VALUE: 23
PIF_VALUE: 24
PIF_VALUE: 21
PIF_VALUE: 21
PIF_VALUE: 29
PIF_VALUE: 22
PIF_VALUE: 21

## 2018-11-04 NOTE — ED TRIAGE NOTES
Pt. Arrives to ED via LS 1 for  C/o v-fib arrest.  Pt. Was walking with friends from [employer] pt. Grabbed chest feeling SOB and fell to ground. Pt received 2 epi, 300mg of Amiodarone, 150mg of Amiodarone, 2gm Magnesium and 6 shocks PTA. Pt. Arrives intubated with dann airway.   Davila Gravel in place on arrival.

## 2018-11-04 NOTE — PLAN OF CARE
Problem: Nutrition  Goal: Optimal nutrition therapy  Outcome: Ongoing  Nutrition Problem: Inadequate oral intake  Intervention: Food and/or Nutrient Delivery: Continue NPO - If Tube Feedings to start, suggest Vital AF 1.2 (semi-elemental) goal 65-70 mL/hr. Nutritional Goals: Meet % of estimated nutrition needs.

## 2018-11-04 NOTE — SIGNIFICANT EVENT
03:24 am: Rapid response called. The patient is s/p V-fib, STEMI arrest on hypothermia protocol. He underwent left heart cath with stenting around 12 am. He was sedated with Propofol. He started becoming hypotensive, BP in 80s/50s; rapid was called. The Propofol drip was discontinued and Versed started instead. He was given fluids. Levophed was ordered but not given because his pressures improved to 100s/50s. Cardiology was called and agreed with these recommendations. BMP, Ca, mag, glucose labs were sent.         Remy Cloud MD  PGY-1, Internal medicine resident  [place], [place]

## 2018-11-04 NOTE — FLOWSHEET NOTE
met with family after procedure. They were waiting to be transferred to the first floor of [facility]. Patient is reported to have two 100% blockages. Family somewhat anxious regarding the waiting process over the next to days to assess if there was any permanent damage to the patient. Family discussed incident and processed emotions with . Anxiety reduced and family seems hopeful. 11/04/18 0108   Encounter Summary   Services provided to: Family   Referral/Consult From: [facility]   Support System Spouse; Children;Family members   Continue Visiting (11.4.2018)   Complexity of Encounter High   Length of Encounter 15 minutes   Routine   Type Post-procedure   Assessment Approachable; Hopeful;Coping   Intervention Active listening;Explored feelings, thoughts, concerns;Nurtured hope;Discussed illness/injury and it's impact   Outcome Expressed gratitude; Less anxious, less agitated;Engaged in conversation; Hopeful     Electronically signed by Keith Khan on 11/4/2018 at 1:15 AM

## 2018-11-04 NOTE — H&P
[facility] Cardiology Cardiology   History & Physical               Today's Date: 11/3/2018  Patient Name: Joel Pabon  Date of admission: 11/3/2018 10:33 PM  Patient's age: 66 y.o., 1960  Admission Dx: Cardiac arrest [facility]) [I46.9]    Reason for Admission:  Cardiac Arrest    CHIEF COMPLAINT:  Chest Pain  Chief Complaint   Patient presents with    Cardiac Arrest     pt. arrives LS1 v-fib arrest, CPR in progress, 2 epi, 6 shocks, 300mg and 150mg of amiodarone and 2g Mg PTA       History Obtained From:  electronic medical record, reason patient could not give history:  on ventilator and sedated. HISTORY OF PRESENT ILLNESS:      The patient is a 66 y.o.  male who is admitted to the hospital for cardiac arrest. The patient was walking out of the [facility] game when he was reportedly noted to be grabbing his chest, c/o shortness of breath before suddenly collapsing to the ground. Per report, bystander CPR began immediately and a Fawn Jetty Airway was put in. He was in v fib and required multiple shocks x 6, rounds of CPR and IV pushes including amiodarone. On arrival to the ED, he had more episodes of v fib requiring shock x 2 in addition to calcium and sodium bicarbonate after which ROSC was obtained. ECG showed injury pattern in the anteroseptal area. After intubation, there was persistent hypoxia and the decision was made by ED providers to divert the patient to CTa of the chest to r/o PE prior to transfer to the Cath lab. His oxygen saturations improved after increasing PEEP to 12. Initial ABG in the ED 7.11/79/50/25     In the ED, he received 324 mg of ASA and 4000 units of IV heparin. Past Medical History:   has a past medical history of Hypertension. Past Surgical History:   has no past surgical history on file. Home Medications:    Prior to Admission medications    Not on File         Allergies:  Patient has no allergy information on record.     Social History:        Family History: family history is not on file. REVIEW OF SYSTEMS:    Unobtainable due to mental status, intubated and sedated. PHYSICAL EXAM:      BP (!) 190/111   Pulse 73   Resp (!) 34   Wt (!) 330 lb (149.7 kg)   SpO2 (!) 31%    No intake or output data in the 24 hours ending 11/03/18 2351      Constitutional and General Appearance: intubated and sedated, moving some extremities, biting his ET tube. Eyes:  sclera anicteric, left eye normal, right eye normal  Mouth, Throat:  ET tube noted along with NG. Cardiovascular:  regular rate and rhythm, S1, S2 normal, no S3 or S4, no click and no rub  Respiratory:  b/l air entry present. Gastrointestinal:  soft, obese  Extremities: No calf assymetry. Lower extremity edema none bilateral  Musculoskeletal:  no joint tenderness, deformity or swelling, no muscular tenderness noted. 2 IO access on b/l shins. Skin:  warm and dry and anicteric  Neurologic:  not examined  Psychiatric:  Intubated and sedated. DATA:    Diagnostics:    EKG: SR with 1st degree AV block. Anteroseptal injury pattern. IVCD. CTa chest:   Negative CTA for pulmonary embolus.       NG tube extends into the airway terminated in the bronchus intermedius. Repositioning is recommended.  This finding was discussed with the patient's   nurse Ty Givens on 11/03/2018 at 2340 hours       Bilateral lung infiltrates questioning aspiration       Bilateral anterolateral rib fractures likely related to the CPR.     Left renal cyst       Gallstone       Labs:     CBC: No results for input(s): WBC, HGB, HCT, PLT in the last 72 hours. BMP:   Recent Labs      11/03/18   2234   CREATININE  1.78*   LABGLOM  40*     Pro-BNP:  No results for input(s): PROBNP in the last 72 hours. BNP: No results for input(s): BNP in the last 72 hours. PT/INR: No results for input(s): PROTIME, INR in the last 72 hours. APTT:No results for input(s): APTT in the last 72 hours.   CARDIAC ENZYMES:No results for input(s): CKTOTAL, CKMB, CKMBINDEX,

## 2018-11-04 NOTE — ED NOTES
Pt. Intubated at this  Time by Dr. Brisa Scott  Positive color change  Fog in tube  8.0 ETT tube 26 at the [facility]       Enedelia Bailey RN  11/03/18 9890

## 2018-11-04 NOTE — CONSULTS
Taking? Authorizing Provider   aspirin 81 MG EC tablet Take 81 mg by mouth daily   Yes Historical Provider, MD   Cyanocobalamin (B-12) 1000 MCG TABS Take 1 tablet by mouth daily   Yes Historical Provider, MD   Riboflavin (B-2) 100 MG TABS Take 1 tablet by mouth daily   Yes Historical Provider, MD   losartan-hydrochlorothiazide (HYZAAR) 100-25 MG per tablet Take 1 tablet by mouth daily   Yes Historical Provider, MD   folic acid (FOLVITE) 405 MCG tablet Take 800 mcg by mouth daily   Yes Historical Provider, MD   ranitidine (RANITIDINE 150 MAX STRENGTH) 150 MG tablet Take 150 mg by mouth 2 times daily as needed for Heartburn   Yes Historical Provider, MD   Ferrous Gluconate (IRON 27 PO) Take 1 tablet by mouth daily   Yes Historical Provider, MD   amLODIPine (NORVASC) 10 MG tablet Take 10 mg by mouth daily   Yes Historical Provider, MD   carvedilol (COREG) 25 MG tablet Take 25 mg by mouth 2 times daily (with meals)   Yes Historical Provider, MD     IMMUNIZATIONS:    IREVIEW OF SYSTEMS:  Not obtained due to patient being intubated and sedated     PHYSICAL EXAMINATION     VITAL SIGNS:   /86   Pulse 62   Temp (!) 91.6 °F (33.1 °C) Comment (Src): zoll  Resp 24   Ht 5' 11\" (1.803 m)   Wt (!) 330 lb 14.4 oz (150.1 kg)   SpO2 100%   BMI 46.15 kg/m²   SYSTEMIC EXAMINATION:   General:          Intubated and sedated. Neck:               No JVD, no thyromegaly, no lymphadenopathy. Chest:              Bilateral vesicular breath sounds, no rales or wheezes. Cardiac:           S1 S2 RR, no murmurs, gallops or rubs, JVP not raised. Abdomen:        Soft, non-tender, non distended, BS audible. :                  No suprapubic or flank tenderness. SKIN:               No rashes, good skin turgor.   Extremities:     No edema, No clubbing, No cyanosis  DATA REVIEW     Medications: Current Inpatient  Scheduled Meds:   sodium chloride flush  10 mL Intravenous 2 times per day    atorvastatin  80 mg Oral Nightly    [START

## 2018-11-04 NOTE — CONSULTS
N/A    Number of children: N/A    Years of education: N/A     Occupational History    manager      Social History Main Topics    Smoking status: Never Smoker    Smokeless tobacco: Former User     Quit date: 11/4/1993    Alcohol use 0.6 oz/week     1 Shots of liquor per week    Drug use: No    Sexual activity: Not on file     Other Topics Concern    Not on file     Social History Narrative    No narrative on file       Family History:        Family History   Problem Relation Age of Onset    Diabetes Mother     Heart Disease Mother        Outpatient Medications:     No prescriptions prior to admission. Current Medications:     Scheduled Meds:    sodium chloride flush  10 mL Intravenous 2 times per day    atorvastatin  80 mg Oral Nightly    [START ON 11/5/2018] aspirin  81 mg Oral Daily    ticagrelor  90 mg Oral BID    chlorhexidine  15 mL Mouth/Throat BID    insulin lispro  0-6 Units Subcutaneous 4x Daily AC & HS    insulin lispro  0-3 Units Subcutaneous Nightly    [START ON 11/5/2018] influenza virus vaccine  0.5 mL Intramuscular Once    pantoprazole  40 mg Intravenous Daily    And    sodium chloride (PF)  10 mL Intravenous Daily    ampicillin-sulbactam  1.5 g Intravenous Q6H     Continuous Infusions:    dextrose      propofol Stopped (11/04/18 0345)    cisatracurium (NIMBEX) infusion 0.75 mcg/kg/min (11/04/18 1016)    sodium chloride 50 mL/hr at 11/04/18 0129    norepinephrine 2 mcg/min (11/04/18 0340)    midazolam 7 mg/hr (11/04/18 1021)     PRN Meds:  sodium chloride flush, acetaminophen, magnesium hydroxide, docusate sodium, ondansetron, glucose, dextrose, glucagon (rDNA), dextrose, fentanNYL, AKWA TEARS, potassium chloride, magnesium sulfate, sodium phosphate IVPB **OR** sodium phosphate IVPB    Review of Systems:     Unable to assess given patient is sedated intubated    Input/Output:       I/O last 3 completed shifts:   In: 330 [I.V.:757]  Out: 1360 [Urine:1210; Emesis/NG

## 2018-11-04 NOTE — ED PROVIDER NOTES
[facility] ED  Emergency Department Encounter  EmergencyMedicine Resident     Pt Name:Bran Benitez  MRN: [id number]  [place] 1960  Date of evaluation: 11/3/18  PCP:  Ailyn Roque MD    CHIEF COMPLAINT       Chief Complaint   Patient presents with   [facility] Cardiac Arrest     pt. arrives LS1 v-fib arrest, CPR in progress, 2 epi, 6 shocks, 300mg and 150mg of amiodarone and 2g Mg PTA       HISTORY OF PRESENT ILLNESS  (Location/Symptom, Timing/Onset, Context/Setting, Quality, Duration, Modifying Factors, Severity.)      Lobito Dhillon is a 66 y.o. male who presents with Cardiac arrest.  Patient presents via EMS. Patient had a witnessed fall after complaining of shortness of breath and a cold sensation in his chest.  He does have a known history of hypertension. Patient was pulseless on EMS arrival CPR was begun immediately. On arrival to the emergency department CPR had been in progress for approximately 25 minutes. Patient had received 301 50 of amiodarone as well as to milligrams of epinephrine, 2 g Mag. He received 3-4 shocks for ventricular fibrillation. Marina Rashid device is in place on arrival giving compressions. Initial pulse check, patient is pulseless but there is an end tidal at 50. Patient is intubated with a Regan airway. Another round of CPR completed with 1 mg of epinephrine, 1 amp of bicarbonate as well as 1 g of calcium was given on arrival.  First pulse check did not reveal any pulses and CPR is continued. A second pulse check, patient did have a pulse. Patient was differently one more time in the emergency department. Please see nursing run sheet for full details. PAST MEDICAL / SURGICAL / SOCIAL / FAMILY HISTORY      has a past medical history of Hypertension. Social History     Social History    Marital status:      Spouse name: N/A    Number of children: N/A    Years of education: N/A     Occupational History    Not on file.      Social History Main REPORTED     RBC Morphology NOT REPORTED     Platelet Estimate NOT REPORTED    Venous Blood Gas, POC   Result Value Ref Range    pH, Johann 7.161 (LL) 7.320 - 7.430    pCO2, Johann 87.9 (HH) 41.0 - 51.0 mm Hg    pO2, Johann 7.4 (L) 30.0 - 50.0 mm Hg    HCO3, Venous 31.4 (H) 22.0 - 29.0 mmol/L    Total CO2, Venous 34 (H) 23.0 - 30.0 mmol/L    Negative Base Excess, Johann 1 0.0 - 2.0    Positive Base Excess, Johann NOT REPORTED 0.0 - 3.0    O2 Sat, Johann 4 (L) 60.0 - 85.0 %    O2 Device/Flow/% NOT REPORTED     Dwain Test NOT REPORTED     Sample Site NOT REPORTED     Mode NOT REPORTED     FIO2 NOT REPORTED     Pt Temp NOT REPORTED     POC pH Temp NOT REPORTED     POC pCO2 Temp NOT REPORTED mm Hg    POC pO2 Temp NOT REPORTED mm Hg   Creatinine W/GFR Point of Care   Result Value Ref Range    POC Creatinine 1.78 (H) 0.51 - 1.19 mg/dL    GFR Comment 48 (L) >60 mL/min    GFR Non-African American 40 (L) >60 mL/min    GFR Comment         Lactic Acid, POC   Result Value Ref Range    POC Lactic Acid 6.40 (H) 0.56 - 1.39 mmol/L   POCT Glucose   Result Value Ref Range    POC Glucose 134 (H) 74 - 100 mg/dL   Anion Gap (Calc) POC   Result Value Ref Range    Anion Gap 11 7 - 16 mmol/L   Arterial Blood Gas, POC   Result Value Ref Range    POC pH 7.114 (LL) 7.350 - 7.450    POC pCO2 79.0 (HH) 35.0 - 48.0 mm Hg    POC PO2 49.8 (LL) 83.0 - 108.0 mm Hg    POC HCO3 25.4 21.0 - 28.0 mmol/L    TCO2 (calc), Art 28 22.0 - 29.0 mmol/L    Negative Base Excess, Art 6 (H) 0.0 - 2.0    Positive Base Excess, Art NOT REPORTED 0.0 - 3.0    POC O2 SAT 69 (L) 94.0 - 98.0 %    O2 Device/Flow/% Adult Ventilator     Dwain Test POSITIVE     Sample Site Left Radial Artery     Mode NOT REPORTED     FIO2 100.0     Pt Temp NOT REPORTED     POC pH Temp NOT REPORTED     POC pCO2 Temp NOT REPORTED mm Hg    POC pO2 Temp NOT REPORTED mm Hg       IMPRESSION: Patient's cardiac arrest.  See above for full details.   He had Sebas Carrizales can then lost pulses again with returncirculation second time.  After the first Mikala Simpers, patient did become hypoxic. Patient was desaturating into the 50s and 60s. He was unclear as to the cause of this. ET tube was checked. Patient was intubated by Dr. Santo Sapp. End-tidal CO2 remained at 30 , however patient was pulseless and CPR was restarted. After epinephrine, patient did have ROSC for the second time. PEEP was increased from 5 to 12 and patient;s 02 sat increased with it. However due to hypoxia, patient was taken to CT scan to r/o PE. PE scan was grossly negative and patient was taken to cath lab. RADIOLOGY:  Xr Chest Portable    Result Date: 11/4/2018  NG tube malpositioned in the right lower lobe. Tube should be removed. Bilateral upper lobe infiltrates questioning aspiration. Ct Chest Pulmonary Embolism W Contrast    Result Date: 11/3/2018  Negative CTA for pulmonary embolus. NG tube extends into the airway terminated in the bronchus intermedius. Repositioning is recommended. This finding was discussed with the patient's nurse Cierra Mcgrath on 11/03/2018 at 2340 hours Bilateral lung infiltrates questioning aspiration Bilateral anterolateral rib fractures likely related to the CPR. Left renal cyst Gallstone       EKG  EKG Interpretation    Interpreted by me    Rhythm: normal sinus   Rate: normal  Axis: normal  Ectopy: none  Conduction: normal  ST Segments: > 1 mm elevation in leads v1, v2, v3  T Waves: non specific changes  Q Waves: present in leads v2, v3, v4    Clinical Impression:  Anterior-septal Myocardial Infarction - recent or probably acute    All EKG's are interpreted by the Emergency Department Physician who either signs or Co-signs this chart in the absence of a cardiologist.    EMERGENCY DEPARTMENT COURSE:  [id number]: STEMI paged  [id number]: Dr. Elizabeth Wharton called back  [phone]: Patient to cath lab    PROCEDURES:  None    CONSULTS:  IP CONSULT TO SOCIAL WORK    CRITICAL CARE:  None    FINAL IMPRESSION      1.  Cardiac arrest ([facility] [address]          DISPOSITION / PLAN

## 2018-11-04 NOTE — FLOWSHEET NOTE
Visited and prayed at patient's bedside. Patient was intubated and sedated at the time. Family was not present. Patient was raised Roman Catholic and was anointed after praying for him. Chaplains would continue to visit for on going assessment of patient's condition and for more spiritual and emotional support. 11/04/18 1040   Encounter Summary   Services provided to: Patient   Support System Family members   Continue Visiting (11/04/2018)   Complexity of Encounter High   Length of Encounter 15 minutes   Spiritual/Mu-ism   Type Spiritual support   Assessment Approachable;Calm   Intervention Prayer; Anointing   Sacraments   Sacrament of Sick-Anointing Anointed

## 2018-11-05 LAB
ABSOLUTE EOS #: <0.03 K/UL (ref 0–0.44)
ABSOLUTE IMMATURE GRANULOCYTE: 0.11 K/UL (ref 0–0.3)
ABSOLUTE LYMPH #: 0.87 K/UL (ref 1.1–3.7)
ABSOLUTE MONO #: 0.8 K/UL (ref 0.1–1.2)
ALBUMIN SERPL-MCNC: 3.9 G/DL (ref 3.5–5.2)
ALBUMIN/GLOBULIN RATIO: 1.2 (ref 1–2.5)
ALLEN TEST: ABNORMAL
ALP BLD-CCNC: 70 U/L (ref 40–129)
ALT SERPL-CCNC: 521 U/L (ref 5–41)
ANION GAP SERPL CALCULATED.3IONS-SCNC: 15 MMOL/L (ref 9–17)
ANION GAP SERPL CALCULATED.3IONS-SCNC: 16 MMOL/L (ref 9–17)
AST SERPL-CCNC: 268 U/L
BASOPHILS # BLD: 0 % (ref 0–2)
BASOPHILS ABSOLUTE: 0.05 K/UL (ref 0–0.2)
BILIRUB SERPL-MCNC: 1.15 MG/DL (ref 0.3–1.2)
BILIRUBIN DIRECT: <0.08 MG/DL
BILIRUBIN, INDIRECT: ABNORMAL MG/DL (ref 0–1)
BUN BLDV-MCNC: 36 MG/DL (ref 6–20)
BUN BLDV-MCNC: 40 MG/DL (ref 6–20)
BUN/CREAT BLD: ABNORMAL (ref 9–20)
BUN/CREAT BLD: ABNORMAL (ref 9–20)
CALCIUM IONIZED: 1.16 MMOL/L (ref 1.13–1.33)
CALCIUM IONIZED: 1.18 MMOL/L (ref 1.13–1.33)
CALCIUM SERPL-MCNC: 8.4 MG/DL (ref 8.6–10.4)
CALCIUM SERPL-MCNC: 8.9 MG/DL (ref 8.6–10.4)
CHLORIDE BLD-SCNC: 106 MMOL/L (ref 98–107)
CHLORIDE BLD-SCNC: 107 MMOL/L (ref 98–107)
CO2: 20 MMOL/L (ref 20–31)
CO2: 22 MMOL/L (ref 20–31)
COMPLEMENT C3: 121 MG/DL (ref 90–180)
COMPLEMENT C4: 26 MG/DL (ref 10–40)
CREAT SERPL-MCNC: 2.19 MG/DL (ref 0.7–1.2)
CREAT SERPL-MCNC: 2.97 MG/DL (ref 0.7–1.2)
CREATININE URINE: 93.8 MG/DL (ref 39–259)
CULTURE: NO GROWTH
DIFFERENTIAL TYPE: ABNORMAL
EOSINOPHILS RELATIVE PERCENT: 0 % (ref 1–4)
FIO2: 40
FREE KAPPA/LAMBDA RATIO: 2.63 (ref 0.26–1.65)
GFR AFRICAN AMERICAN: 27 ML/MIN
GFR AFRICAN AMERICAN: 38 ML/MIN
GFR NON-AFRICAN AMERICAN: 22 ML/MIN
GFR NON-AFRICAN AMERICAN: 31 ML/MIN
GFR SERPL CREATININE-BSD FRML MDRD: ABNORMAL ML/MIN/{1.73_M2}
GLOBULIN: ABNORMAL G/DL (ref 1.5–3.8)
GLUCOSE BLD-MCNC: 104 MG/DL (ref 75–110)
GLUCOSE BLD-MCNC: 107 MG/DL (ref 75–110)
GLUCOSE BLD-MCNC: 114 MG/DL (ref 75–110)
GLUCOSE BLD-MCNC: 118 MG/DL (ref 75–110)
GLUCOSE BLD-MCNC: 123 MG/DL (ref 70–99)
GLUCOSE BLD-MCNC: 134 MG/DL (ref 70–99)
GLUCOSE BLD-MCNC: 98 MG/DL (ref 75–110)
GLUCOSE BLD-MCNC: 99 MG/DL (ref 75–110)
HCT VFR BLD CALC: 45.4 % (ref 40.7–50.3)
HEMOGLOBIN: 15.4 G/DL (ref 13–17)
IMMATURE GRANULOCYTES: 1 %
INR BLD: 1
KAPPA FREE LIGHT CHAINS QNT: 3.34 MG/DL (ref 0.37–1.94)
LACTIC ACID, WHOLE BLOOD: 1 MMOL/L (ref 0.7–2.1)
LAMBDA FREE LIGHT CHAINS QNT: 1.27 MG/DL (ref 0.57–2.63)
LYMPHOCYTES # BLD: 5 % (ref 24–43)
Lab: NORMAL
MAGNESIUM: 2.2 MG/DL (ref 1.6–2.6)
MAGNESIUM: 2.2 MG/DL (ref 1.6–2.6)
MCH RBC QN AUTO: 29.2 PG (ref 25.2–33.5)
MCHC RBC AUTO-ENTMCNC: 33.9 G/DL (ref 28.4–34.8)
MCV RBC AUTO: 86 FL (ref 82.6–102.9)
MODE: ABNORMAL
MONOCYTES # BLD: 4 % (ref 3–12)
NEGATIVE BASE EXCESS, ART: 3 (ref 0–2)
NRBC AUTOMATED: 0 PER 100 WBC
O2 DEVICE/FLOW/%: ABNORMAL
PARTIAL THROMBOPLASTIN TIME: 24.8 SEC (ref 20.5–30.5)
PATHOLOGIST: NORMAL
PATIENT TEMP: 33.1
PDW BLD-RTO: 13.1 % (ref 11.8–14.4)
PHOSPHORUS: 3.4 MG/DL (ref 2.5–4.5)
PHOSPHORUS: 4 MG/DL (ref 2.5–4.5)
PLATELET # BLD: 205 K/UL (ref 138–453)
PLATELET ESTIMATE: ABNORMAL
PMV BLD AUTO: 10.2 FL (ref 8.1–13.5)
POC HCO3: 22.6 MMOL/L (ref 21–28)
POC O2 SATURATION: 95 % (ref 94–98)
POC PCO2 TEMP: 37 MM HG
POC PCO2: 43.3 MM HG (ref 35–48)
POC PH TEMP: 7.38
POC PH: 7.33 (ref 7.35–7.45)
POC PO2 TEMP: 65 MM HG
POC PO2: 83.5 MM HG (ref 83–108)
POSITIVE BASE EXCESS, ART: ABNORMAL (ref 0–3)
POTASSIUM SERPL-SCNC: 3.3 MMOL/L (ref 3.7–5.3)
POTASSIUM SERPL-SCNC: 3.3 MMOL/L (ref 3.7–5.3)
POTASSIUM SERPL-SCNC: 3.5 MMOL/L (ref 3.7–5.3)
POTASSIUM SERPL-SCNC: 3.8 MMOL/L (ref 3.7–5.3)
PROTHROMBIN TIME: 10.3 SEC (ref 9–12)
RBC # BLD: 5.28 M/UL (ref 4.21–5.77)
RBC # BLD: ABNORMAL 10*6/UL
SAMPLE SITE: ABNORMAL
SEG NEUTROPHILS: 90 % (ref 36–65)
SEGMENTED NEUTROPHILS ABSOLUTE COUNT: 16.74 K/UL (ref 1.5–8.1)
SERUM IFX INTERP: NORMAL
SODIUM BLD-SCNC: 143 MMOL/L (ref 135–144)
SODIUM BLD-SCNC: 143 MMOL/L (ref 135–144)
SODIUM,UR: 41 MMOL/L
SPECIMEN DESCRIPTION: NORMAL
STATUS: NORMAL
TCO2 (CALC), ART: 24 MMOL/L (ref 22–29)
TOTAL PROTEIN, URINE: 49 MG/DL
TOTAL PROTEIN: 7.1 G/DL (ref 6.4–8.3)
TROPONIN INTERP: ABNORMAL
TROPONIN T: 2.23 NG/ML
WBC # BLD: 18.6 K/UL (ref 3.5–11.3)
WBC # BLD: ABNORMAL 10*3/UL

## 2018-11-05 PROCEDURE — 83883 ASSAY NEPHELOMETRY NOT SPEC: CPT

## 2018-11-05 PROCEDURE — 2000000000 HC ICU R&B

## 2018-11-05 PROCEDURE — 6370000000 HC RX 637 (ALT 250 FOR IP): Performed by: INTERNAL MEDICINE

## 2018-11-05 PROCEDURE — 2580000003 HC RX 258: Performed by: INTERNAL MEDICINE

## 2018-11-05 PROCEDURE — 86403 PARTICLE AGGLUT ANTBDY SCRN: CPT

## 2018-11-05 PROCEDURE — C9113 INJ PANTOPRAZOLE SODIUM, VIA: HCPCS | Performed by: INTERNAL MEDICINE

## 2018-11-05 PROCEDURE — 82803 BLOOD GASES ANY COMBINATION: CPT

## 2018-11-05 PROCEDURE — 84156 ASSAY OF PROTEIN URINE: CPT

## 2018-11-05 PROCEDURE — 6360000002 HC RX W HCPCS: Performed by: INTERNAL MEDICINE

## 2018-11-05 PROCEDURE — 85730 THROMBOPLASTIN TIME PARTIAL: CPT

## 2018-11-05 PROCEDURE — 87205 SMEAR GRAM STAIN: CPT

## 2018-11-05 PROCEDURE — 84132 ASSAY OF SERUM POTASSIUM: CPT

## 2018-11-05 PROCEDURE — 94762 N-INVAS EAR/PLS OXIMTRY CONT: CPT

## 2018-11-05 PROCEDURE — 94003 VENT MGMT INPAT SUBQ DAY: CPT

## 2018-11-05 PROCEDURE — 36415 COLL VENOUS BLD VENIPUNCTURE: CPT

## 2018-11-05 PROCEDURE — 83605 ASSAY OF LACTIC ACID: CPT

## 2018-11-05 PROCEDURE — 94770 HC ETCO2 MONITOR DAILY: CPT

## 2018-11-05 PROCEDURE — 82570 ASSAY OF URINE CREATININE: CPT

## 2018-11-05 PROCEDURE — 83735 ASSAY OF MAGNESIUM: CPT

## 2018-11-05 PROCEDURE — 89220 SPUTUM SPECIMEN COLLECTION: CPT

## 2018-11-05 PROCEDURE — 84484 ASSAY OF TROPONIN QUANT: CPT

## 2018-11-05 PROCEDURE — 87086 URINE CULTURE/COLONY COUNT: CPT

## 2018-11-05 PROCEDURE — 99291 CRITICAL CARE FIRST HOUR: CPT | Performed by: INTERNAL MEDICINE

## 2018-11-05 PROCEDURE — 2700000000 HC OXYGEN THERAPY PER DAY

## 2018-11-05 PROCEDURE — 84300 ASSAY OF URINE SODIUM: CPT

## 2018-11-05 PROCEDURE — 2500000003 HC RX 250 WO HCPCS: Performed by: INTERNAL MEDICINE

## 2018-11-05 PROCEDURE — 85610 PROTHROMBIN TIME: CPT

## 2018-11-05 PROCEDURE — 86334 IMMUNOFIX E-PHORESIS SERUM: CPT

## 2018-11-05 PROCEDURE — 80048 BASIC METABOLIC PNL TOTAL CA: CPT

## 2018-11-05 PROCEDURE — 86160 COMPLEMENT ANTIGEN: CPT

## 2018-11-05 PROCEDURE — 82330 ASSAY OF CALCIUM: CPT

## 2018-11-05 PROCEDURE — 85025 COMPLETE CBC W/AUTO DIFF WBC: CPT

## 2018-11-05 PROCEDURE — 87070 CULTURE OTHR SPECIMN AEROBIC: CPT

## 2018-11-05 PROCEDURE — 84100 ASSAY OF PHOSPHORUS: CPT

## 2018-11-05 PROCEDURE — 82947 ASSAY GLUCOSE BLOOD QUANT: CPT

## 2018-11-05 PROCEDURE — 2500000003 HC RX 250 WO HCPCS: Performed by: STUDENT IN AN ORGANIZED HEALTH CARE EDUCATION/TRAINING PROGRAM

## 2018-11-05 PROCEDURE — 80076 HEPATIC FUNCTION PANEL: CPT

## 2018-11-05 PROCEDURE — 87186 SC STD MICRODIL/AGAR DIL: CPT

## 2018-11-05 RX ORDER — LABETALOL HYDROCHLORIDE 5 MG/ML
5 INJECTION, SOLUTION INTRAVENOUS ONCE
Status: COMPLETED | OUTPATIENT
Start: 2018-11-05 | End: 2018-11-05

## 2018-11-05 RX ADMIN — LABETALOL HYDROCHLORIDE 5 MG: 5 INJECTION, SOLUTION INTRAVENOUS at 18:15

## 2018-11-05 RX ADMIN — AMPICILLIN SODIUM AND SULBACTAM SODIUM 1.5 G: 1; .5 INJECTION, POWDER, FOR SOLUTION INTRAMUSCULAR; INTRAVENOUS at 17:19

## 2018-11-05 RX ADMIN — PROPOFOL 10 MCG/KG/MIN: 10 INJECTION, EMULSION INTRAVENOUS at 02:52

## 2018-11-05 RX ADMIN — CHLORHEXIDINE GLUCONATE 0.12% ORAL RINSE 15 ML: 1.2 LIQUID ORAL at 08:31

## 2018-11-05 RX ADMIN — Medication 7 MG/HR: at 02:12

## 2018-11-05 RX ADMIN — FENTANYL CITRATE 25 MCG: 50 INJECTION INTRAMUSCULAR; INTRAVENOUS at 01:32

## 2018-11-05 RX ADMIN — ACETAMINOPHEN 650 MG: 325 TABLET ORAL at 21:43

## 2018-11-05 RX ADMIN — FENTANYL CITRATE 25 MCG: 50 INJECTION INTRAMUSCULAR; INTRAVENOUS at 02:12

## 2018-11-05 RX ADMIN — ASPIRIN 81 MG: 81 TABLET, CHEWABLE ORAL at 08:31

## 2018-11-05 RX ADMIN — FENTANYL CITRATE 25 MCG: 50 INJECTION INTRAMUSCULAR; INTRAVENOUS at 19:42

## 2018-11-05 RX ADMIN — DEXMEDETOMIDINE HYDROCHLORIDE 0.2 MCG/KG/HR: 100 INJECTION, SOLUTION INTRAVENOUS at 21:00

## 2018-11-05 RX ADMIN — TICAGRELOR 90 MG: 90 TABLET ORAL at 20:50

## 2018-11-05 RX ADMIN — CHLORHEXIDINE GLUCONATE 0.12% ORAL RINSE 15 ML: 1.2 LIQUID ORAL at 20:50

## 2018-11-05 RX ADMIN — ATORVASTATIN CALCIUM 80 MG: 80 TABLET, FILM COATED ORAL at 20:50

## 2018-11-05 RX ADMIN — Medication 10 ML: at 08:33

## 2018-11-05 RX ADMIN — PROPOFOL 15 MCG/KG/MIN: 10 INJECTION, EMULSION INTRAVENOUS at 10:14

## 2018-11-05 RX ADMIN — Medication 10 MG/HR: at 19:07

## 2018-11-05 RX ADMIN — FENTANYL CITRATE 25 MCG: 50 INJECTION INTRAMUSCULAR; INTRAVENOUS at 08:30

## 2018-11-05 RX ADMIN — AMPICILLIN SODIUM AND SULBACTAM SODIUM 1.5 G: 1; .5 INJECTION, POWDER, FOR SOLUTION INTRAMUSCULAR; INTRAVENOUS at 11:56

## 2018-11-05 RX ADMIN — TICAGRELOR 90 MG: 90 TABLET ORAL at 08:31

## 2018-11-05 RX ADMIN — Medication 6 MCG/MIN: at 22:16

## 2018-11-05 RX ADMIN — PANTOPRAZOLE SODIUM 40 MG: 40 INJECTION, POWDER, FOR SOLUTION INTRAVENOUS at 08:31

## 2018-11-05 ASSESSMENT — PULMONARY FUNCTION TESTS
PIF_VALUE: 21
PIF_VALUE: 21
PIF_VALUE: 11
PIF_VALUE: 16
PIF_VALUE: 11
PIF_VALUE: 22
PIF_VALUE: 21
PIF_VALUE: 12
PIF_VALUE: 15
PIF_VALUE: 14
PIF_VALUE: 22

## 2018-11-05 NOTE — PROGRESS NOTES
assistance. 3. Continue ASA, Brilinta, Lipitor. 4. Hold BB for now given his labile BP.   5. Nephrology consult for CARMINE        Muna Crocker MD  Fellow, [address]      Attending note,   The patient was seen and examined, agree with above, intubated and on hypothermia protocol. Continue current medications. Neurology consult.

## 2018-11-05 NOTE — FLOWSHEET NOTE
This  responded to spiritual service consult for Advance Directive documents. I delivered the documents for the [address] [place] [address] and Living Will. Patient is intubated and sedated. Per RN the patient is not able to complete the documents at this time as he is unresponsive and it is not known if this will be possible going forward. Per RN the patient has good family support from his wife, daughter and niece. No family present at the time of my visit as it was in the middle of the night. I left the documents in the patient's chart and told the RN that a  can be called back when/if the patient should recover and be able to complete them.         11/05/18 0107   Encounter Summary   Services provided to: Patient   Continue Visiting (11/5/18)   Complexity of Encounter Low   Length of Encounter 15 minutes   Advance Directives (For Healthcare)   Advance Directives Documents given

## 2018-11-05 NOTE — PLAN OF CARE
Problem: OXYGENATION/RESPIRATORY FUNCTION  Goal: Patient will maintain patent airway  Outcome: Ongoing    Goal: Patient will achieve/maintain normal respiratory rate/effort  Respiratory rate and effort will be within normal limits for the patient   Outcome: Ongoing      Problem: MECHANICAL VENTILATION  Goal: Patient will maintain patent airway  Outcome: Ongoing    Goal: Oral health is maintained or improved  Outcome: Ongoing    Goal: ET tube will be managed safely  Outcome: Ongoing      Problem: SKIN INTEGRITY  Goal: Skin integrity is maintained or improved  Outcome: Ongoing  Pt repositioned as needed for comfort and to reduce skin breakdown. Skin assessed and no new breakdown noted per RN. Heels elevated off bed. Will cont to monitor    Problem: Risk for Impaired Skin Integrity  Goal: Tissue integrity - skin and mucous membranes  Structural intactness and normal physiological function of skin and  mucous membranes.    Outcome: Ongoing

## 2018-11-06 ENCOUNTER — APPOINTMENT (OUTPATIENT)
Dept: GENERAL RADIOLOGY | Age: 58
DRG: 246 | End: 2018-11-06
Payer: COMMERCIAL

## 2018-11-06 LAB
-: NORMAL
ALLEN TEST: ABNORMAL
AMORPHOUS: NORMAL
ANION GAP SERPL CALCULATED.3IONS-SCNC: 13 MMOL/L (ref 9–17)
BACTERIA: NORMAL
BILIRUBIN URINE: NEGATIVE
BUN BLDV-MCNC: 48 MG/DL (ref 6–20)
BUN/CREAT BLD: ABNORMAL (ref 9–20)
CALCIUM SERPL-MCNC: 7.9 MG/DL (ref 8.6–10.4)
CASTS UA: NORMAL /LPF (ref 0–8)
CHLORIDE BLD-SCNC: 109 MMOL/L (ref 98–107)
CO2: 22 MMOL/L (ref 20–31)
COLOR: YELLOW
COMMENT UA: ABNORMAL
CREAT SERPL-MCNC: 3.72 MG/DL (ref 0.7–1.2)
CRYSTALS, UA: NORMAL /HPF
CULTURE: ABNORMAL
CULTURE: NO GROWTH
DIRECT EXAM: ABNORMAL
EPITHELIAL CELLS UA: NORMAL /HPF (ref 0–5)
FIO2: 30
GFR AFRICAN AMERICAN: 21 ML/MIN
GFR NON-AFRICAN AMERICAN: 17 ML/MIN
GFR SERPL CREATININE-BSD FRML MDRD: ABNORMAL ML/MIN/{1.73_M2}
GFR SERPL CREATININE-BSD FRML MDRD: ABNORMAL ML/MIN/{1.73_M2}
GLUCOSE BLD-MCNC: 107 MG/DL (ref 75–110)
GLUCOSE BLD-MCNC: 141 MG/DL (ref 75–110)
GLUCOSE BLD-MCNC: 142 MG/DL (ref 74–100)
GLUCOSE BLD-MCNC: 145 MG/DL (ref 70–99)
GLUCOSE BLD-MCNC: 76 MG/DL (ref 75–110)
GLUCOSE BLD-MCNC: 96 MG/DL (ref 75–110)
GLUCOSE URINE: NEGATIVE
KETONES, URINE: ABNORMAL
LEUKOCYTE ESTERASE, URINE: NEGATIVE
Lab: ABNORMAL
Lab: NORMAL
MAGNESIUM: 2 MG/DL (ref 1.6–2.6)
MODE: ABNORMAL
MUCUS: NORMAL
NEGATIVE BASE EXCESS, ART: ABNORMAL (ref 0–2)
NITRITE, URINE: NEGATIVE
O2 DEVICE/FLOW/%: ABNORMAL
OTHER OBSERVATIONS UA: NORMAL
PATIENT TEMP: ABNORMAL
PH UA: 5 (ref 5–8)
POC HCO3: 23.9 MMOL/L (ref 21–28)
POC O2 SATURATION: 98 % (ref 94–98)
POC PCO2 TEMP: ABNORMAL MM HG
POC PCO2: 33.5 MM HG (ref 35–48)
POC PH TEMP: ABNORMAL
POC PH: 7.46 (ref 7.35–7.45)
POC PO2 TEMP: ABNORMAL MM HG
POC PO2: 94.5 MM HG (ref 83–108)
POSITIVE BASE EXCESS, ART: 1 (ref 0–3)
POTASSIUM SERPL-SCNC: 3.3 MMOL/L (ref 3.7–5.3)
POTASSIUM SERPL-SCNC: 3.3 MMOL/L (ref 3.7–5.3)
POTASSIUM SERPL-SCNC: 3.7 MMOL/L (ref 3.7–5.3)
PROTEIN UA: ABNORMAL
RBC UA: NORMAL /HPF (ref 0–4)
RENAL EPITHELIAL, UA: NORMAL /HPF
SAMPLE SITE: ABNORMAL
SODIUM BLD-SCNC: 144 MMOL/L (ref 135–144)
SPECIFIC GRAVITY UA: 1.04 (ref 1–1.03)
SPECIMEN DESCRIPTION: ABNORMAL
SPECIMEN DESCRIPTION: NORMAL
STATUS: ABNORMAL
STATUS: NORMAL
TCO2 (CALC), ART: 25 MMOL/L (ref 22–29)
TRICHOMONAS: NORMAL
TURBIDITY: ABNORMAL
URINE HGB: NEGATIVE
UROBILINOGEN, URINE: NORMAL
WBC UA: NORMAL /HPF (ref 0–5)
YEAST: NORMAL

## 2018-11-06 PROCEDURE — 6360000002 HC RX W HCPCS: Performed by: INTERNAL MEDICINE

## 2018-11-06 PROCEDURE — 82803 BLOOD GASES ANY COMBINATION: CPT

## 2018-11-06 PROCEDURE — C9113 INJ PANTOPRAZOLE SODIUM, VIA: HCPCS | Performed by: INTERNAL MEDICINE

## 2018-11-06 PROCEDURE — 80048 BASIC METABOLIC PNL TOTAL CA: CPT

## 2018-11-06 PROCEDURE — 36415 COLL VENOUS BLD VENIPUNCTURE: CPT

## 2018-11-06 PROCEDURE — 82947 ASSAY GLUCOSE BLOOD QUANT: CPT

## 2018-11-06 PROCEDURE — 76937 US GUIDE VASCULAR ACCESS: CPT

## 2018-11-06 PROCEDURE — 81001 URINALYSIS AUTO W/SCOPE: CPT

## 2018-11-06 PROCEDURE — 83735 ASSAY OF MAGNESIUM: CPT

## 2018-11-06 PROCEDURE — 6370000000 HC RX 637 (ALT 250 FOR IP): Performed by: STUDENT IN AN ORGANIZED HEALTH CARE EDUCATION/TRAINING PROGRAM

## 2018-11-06 PROCEDURE — 2000000000 HC ICU R&B

## 2018-11-06 PROCEDURE — 2580000003 HC RX 258: Performed by: INTERNAL MEDICINE

## 2018-11-06 PROCEDURE — 6370000000 HC RX 637 (ALT 250 FOR IP): Performed by: INTERNAL MEDICINE

## 2018-11-06 PROCEDURE — 2500000003 HC RX 250 WO HCPCS: Performed by: STUDENT IN AN ORGANIZED HEALTH CARE EDUCATION/TRAINING PROGRAM

## 2018-11-06 PROCEDURE — 99232 SBSQ HOSP IP/OBS MODERATE 35: CPT | Performed by: INTERNAL MEDICINE

## 2018-11-06 PROCEDURE — 94770 HC ETCO2 MONITOR DAILY: CPT

## 2018-11-06 PROCEDURE — 99233 SBSQ HOSP IP/OBS HIGH 50: CPT | Performed by: INTERNAL MEDICINE

## 2018-11-06 PROCEDURE — 87040 BLOOD CULTURE FOR BACTERIA: CPT

## 2018-11-06 PROCEDURE — 71045 X-RAY EXAM CHEST 1 VIEW: CPT

## 2018-11-06 PROCEDURE — 84132 ASSAY OF SERUM POTASSIUM: CPT

## 2018-11-06 RX ORDER — 0.9 % SODIUM CHLORIDE 0.9 %
250 INTRAVENOUS SOLUTION INTRAVENOUS ONCE
Status: COMPLETED | OUTPATIENT
Start: 2018-11-06 | End: 2018-11-06

## 2018-11-06 RX ORDER — SODIUM CHLORIDE 450 MG/100ML
INJECTION, SOLUTION INTRAVENOUS CONTINUOUS
Status: DISCONTINUED | OUTPATIENT
Start: 2018-11-06 | End: 2018-11-08

## 2018-11-06 RX ORDER — METOPROLOL TARTRATE 5 MG/5ML
5 INJECTION INTRAVENOUS EVERY 6 HOURS PRN
Status: DISCONTINUED | OUTPATIENT
Start: 2018-11-06 | End: 2018-11-10 | Stop reason: HOSPADM

## 2018-11-06 RX ORDER — POTASSIUM CHLORIDE 20MEQ/15ML
40 LIQUID (ML) ORAL PRN
Status: DISCONTINUED | OUTPATIENT
Start: 2018-11-06 | End: 2018-11-10 | Stop reason: HOSPADM

## 2018-11-06 RX ORDER — FUROSEMIDE 10 MG/ML
40 INJECTION INTRAMUSCULAR; INTRAVENOUS ONCE
Status: COMPLETED | OUTPATIENT
Start: 2018-11-06 | End: 2018-11-06

## 2018-11-06 RX ORDER — DIPHENHYDRAMINE HCL 25 MG
25 TABLET ORAL NIGHTLY PRN
Status: DISCONTINUED | OUTPATIENT
Start: 2018-11-06 | End: 2018-11-10 | Stop reason: HOSPADM

## 2018-11-06 RX ORDER — ALBUTEROL SULFATE 90 UG/1
2 AEROSOL, METERED RESPIRATORY (INHALATION) EVERY 6 HOURS PRN
Status: DISCONTINUED | OUTPATIENT
Start: 2018-11-06 | End: 2018-11-10 | Stop reason: HOSPADM

## 2018-11-06 RX ORDER — DIPHENHYDRAMINE HCL 25 MG
TABLET ORAL
Status: DISPENSED
Start: 2018-11-06 | End: 2018-11-07

## 2018-11-06 RX ORDER — DIPHENHYDRAMINE HCL 25 MG
25 TABLET ORAL NIGHTLY PRN
Status: DISCONTINUED | OUTPATIENT
Start: 2018-11-07 | End: 2018-11-06 | Stop reason: SDUPTHER

## 2018-11-06 RX ORDER — POTASSIUM CHLORIDE 20 MEQ/1
40 TABLET, EXTENDED RELEASE ORAL PRN
Status: DISCONTINUED | OUTPATIENT
Start: 2018-11-06 | End: 2018-11-10 | Stop reason: HOSPADM

## 2018-11-06 RX ORDER — METOPROLOL TARTRATE 5 MG/5ML
5 INJECTION INTRAVENOUS ONCE
Status: COMPLETED | OUTPATIENT
Start: 2018-11-06 | End: 2018-11-06

## 2018-11-06 RX ORDER — POTASSIUM CHLORIDE 7.45 MG/ML
10 INJECTION INTRAVENOUS PRN
Status: DISCONTINUED | OUTPATIENT
Start: 2018-11-06 | End: 2018-11-10 | Stop reason: HOSPADM

## 2018-11-06 RX ADMIN — POTASSIUM CHLORIDE 20 MEQ: 29.8 INJECTION, SOLUTION INTRAVENOUS at 03:40

## 2018-11-06 RX ADMIN — AMPICILLIN SODIUM AND SULBACTAM SODIUM 1.5 G: 1; .5 INJECTION, POWDER, FOR SOLUTION INTRAMUSCULAR; INTRAVENOUS at 09:24

## 2018-11-06 RX ADMIN — AMPICILLIN SODIUM AND SULBACTAM SODIUM 1.5 G: 1; .5 INJECTION, POWDER, FOR SOLUTION INTRAMUSCULAR; INTRAVENOUS at 02:22

## 2018-11-06 RX ADMIN — TICAGRELOR 90 MG: 90 TABLET ORAL at 21:04

## 2018-11-06 RX ADMIN — POTASSIUM CHLORIDE 20 MEQ: 29.8 INJECTION, SOLUTION INTRAVENOUS at 02:30

## 2018-11-06 RX ADMIN — Medication 10 ML: at 09:44

## 2018-11-06 RX ADMIN — METOPROLOL TARTRATE 25 MG: 25 TABLET ORAL at 11:57

## 2018-11-06 RX ADMIN — SODIUM CHLORIDE 250 ML: 9 INJECTION, SOLUTION INTRAVENOUS at 15:00

## 2018-11-06 RX ADMIN — ASPIRIN 81 MG: 81 TABLET, CHEWABLE ORAL at 09:23

## 2018-11-06 RX ADMIN — AMPICILLIN SODIUM AND SULBACTAM SODIUM 1.5 G: 1; .5 INJECTION, POWDER, FOR SOLUTION INTRAMUSCULAR; INTRAVENOUS at 14:07

## 2018-11-06 RX ADMIN — ACETAMINOPHEN 650 MG: 325 TABLET ORAL at 23:01

## 2018-11-06 RX ADMIN — CHLORHEXIDINE GLUCONATE 0.12% ORAL RINSE 15 ML: 1.2 LIQUID ORAL at 09:23

## 2018-11-06 RX ADMIN — ATORVASTATIN CALCIUM 80 MG: 80 TABLET, FILM COATED ORAL at 21:03

## 2018-11-06 RX ADMIN — ACETAMINOPHEN 650 MG: 325 TABLET ORAL at 14:48

## 2018-11-06 RX ADMIN — PANTOPRAZOLE SODIUM 40 MG: 40 INJECTION, POWDER, FOR SOLUTION INTRAVENOUS at 09:23

## 2018-11-06 RX ADMIN — AMPICILLIN SODIUM AND SULBACTAM SODIUM 1.5 G: 1; .5 INJECTION, POWDER, FOR SOLUTION INTRAMUSCULAR; INTRAVENOUS at 21:03

## 2018-11-06 RX ADMIN — METOPROLOL TARTRATE 25 MG: 25 TABLET ORAL at 21:04

## 2018-11-06 RX ADMIN — METOPROLOL TARTRATE 5 MG: 5 INJECTION, SOLUTION INTRAVENOUS at 11:56

## 2018-11-06 RX ADMIN — POTASSIUM CHLORIDE 40 MEQ: 40 SOLUTION ORAL at 18:46

## 2018-11-06 RX ADMIN — SODIUM CHLORIDE: 4.5 INJECTION, SOLUTION INTRAVENOUS at 15:26

## 2018-11-06 RX ADMIN — FUROSEMIDE 40 MG: 10 INJECTION, SOLUTION INTRAMUSCULAR; INTRAVENOUS at 02:25

## 2018-11-06 RX ADMIN — TICAGRELOR 90 MG: 90 TABLET ORAL at 09:23

## 2018-11-06 RX ADMIN — DIPHENHYDRAMINE HCL 25 MG: 25 TABLET ORAL at 23:03

## 2018-11-06 RX ADMIN — INSULIN LISPRO 1 UNITS: 100 INJECTION, SOLUTION INTRAVENOUS; SUBCUTANEOUS at 09:24

## 2018-11-06 RX ADMIN — METOPROLOL TARTRATE 5 MG: 1 INJECTION, SOLUTION INTRAVENOUS at 12:33

## 2018-11-06 ASSESSMENT — PULMONARY FUNCTION TESTS
PIF_VALUE: 12
PIF_VALUE: 20
PIF_VALUE: 9
PIF_VALUE: 17
PIF_VALUE: 16
PIF_VALUE: 17
PIF_VALUE: 19
PIF_VALUE: 17
PIF_VALUE: 11

## 2018-11-06 ASSESSMENT — ENCOUNTER SYMPTOMS
COUGH: 1
EYES NEGATIVE: 1
GASTROINTESTINAL NEGATIVE: 1
ALLERGIC/IMMUNOLOGIC NEGATIVE: 1

## 2018-11-06 NOTE — CONSULTS
edema or deformity. Neurological: He is alert and oriented to person, place, and time. Skin: Skin is warm and dry. Psychiatric: He has a normal mood and affect. His behavior is normal.         Medical Decision Making:   I have independently reviewed/ordered the following labs:    CBC with Differential: Recent Labs      11/03/18   2301   11/04/18   1222  11/05/18   0621   WBC  7.2   < >  15.8*  18.6*   HGB  14.6   < >  14.5  15.4   HCT  46.7   < >  44.9  45.4   PLT  204   < >  222  205   LYMPHOPCT  70*   --    --   5*   MONOPCT  4   --    --   4    < > = values in this interval not displayed. BMP:  Recent Labs      11/05/18   0626   11/05/18   1832   11/06/18   0513  11/06/18   1216   NA  143   --   143   --   144   --    K  3.3*   < >  3.8   < >  3.7  3.3*   CL  106   --   107   --   109*   --    CO2  22   --   20   --   22   --    BUN  36*   --   40*   --   48*   --    CREATININE  2.19*   --   2.97*   --   3.72*   --    MG  2.2   --    --    --   2.0   --     < > = values in this interval not displayed. Hepatic Function Panel: Recent Labs      11/03/18 2301 11/05/18   0007   PROT  6.5  7.1   LABALBU  3.7  3.9   BILIDIR   --   <0.08   IBILI   --   CANNOT BE CALCULATED   BILITOT  0.57  1.15   ALKPHOS  96  70   ALT  427*  521*   AST  315*  268*     No results for input(s): RPR in the last 72 hours. No results for input(s): HIV in the last 72 hours. No results for input(s): BC in the last 72 hours. Lab Results   Component Value Date    CREATININE 3.72 11/06/2018    GLUCOSE 145 11/06/2018       Detailed results: Thank you for allowing us to participate in the care of this patient. Please call with questions.         This note is created with the assistance of a speech recognition program.  While intending to generate adocument that actually reflects the content of the visit, the document can still have some errors including those of syntax and sound a like substitutions which may escape proof

## 2018-11-06 NOTE — PROGRESS NOTES
THYROIDAB, FT3, T4FREE  Immune/Rheum Panel:  No results found for: GRANT, RHEUMFACTOR, SEDRATE, CRP  No results found for: LABURIC, URICACID  Heme/Onc Panel:  No results found for: IRON, TIBC, FERRITIN  No results found for: SPEP, UPEP  No results found for: PSA, CEA, , [id number],           Chest X-ray:  Interval repositioning of the NG tube now in satisfactory position. Otherwise, no significant change in chest findings. CT Scans:  Negative CTA for pulmonary embolus.       NG tube extends into the airway terminating in the bronchus intermedius. Repositioning is recommended.  This finding was discussed with the patient's   nurse Sherley Green on 11/03/2018 at 2340 hours.       Bilateral lung infiltrates questioning aspiration.       Bilateral anterolateral rib fractures likely related to CPR.     Left renal cyst.       Gallstone. Polysomnogram:    Echocardiogram: Summary  Mild concentric left ventricular hypertrophy. Evidence of diastolic dysfunction. Severely reduced left ventricular function. Ejection fraction by Curtis's measured 13%. No apical thrombus noted. Right ventricle is normal in size with reduced function. No significant pericardial effusion is seen. ASSESSMENT AND PLAN     Patient Active Problem List   Diagnosis    Cardiac arrest [facility])    Ventricular fibrillation ([facility] [address]       Assessment:  S/P V.fib arrest   Acute hypoxic respiratory failure requiring mechanical ventilation   Ischemic Cardiomyopathy with reduced EF of 13%   STEMI CAD with stent to LAD and Diagonal  Cardiac arrest/V Fib s/p multiple shocks now on hypothermia protocol    Plan:  - Patient tolerating weaning well will extubate the patient  - nightly BIPAP. - Continue Supportive care  - Aspiration precaution   - Pulmonary Toilet   - Albuterol PRN     Zeenat Salguero, PGY-3, Internal Medicine Residency Resident.   [facility]  Attending Physician Statement  I have discussed

## 2018-11-06 NOTE — PROGRESS NOTES
4477  11/04/18   1222  11/05/18   0621   WBC  14.8*   --   15.8*  18.6*   HGB  13.6  13.6  14.5  15.4   PLT  247   --   222  205     BMP:    Recent Labs      11/05/18   0626   11/05/18   1832  11/06/18   0124  11/06/18   0513   NA  143   --   143   --   144   K  3.3*   < >  3.8  3.3*  3.7   CL  106   --   107   --   109*   CO2  22   --   20   --   22   BUN  36*   --   40*   --   48*   CREATININE  2.19*   --   2.97*   --   3.72*   GLUCOSE  123*   --   134*   --   145*    < > = values in this interval not displayed. Hepatic:   Recent Labs      11/03/18   2301  11/05/18   0007   AST  315*  268*   ALT  427*  521*   BILITOT  0.57  1.15   ALKPHOS  96  70     Troponin: No results for input(s): TROPONINI in the last 72 hours. Recent Labs      11/04/18   1222  11/04/18   1749  11/05/18   0007   TROPONINT  4.66*  3.20*  2.23*     BNP: No results for input(s): PROBNP in the last 72 hours. No results for input(s): BNP in the last 72 hours. Lipids:   Recent Labs      11/04/18   0110   CHOL  192   HDL  40*     INR:   Recent Labs      11/04/18   0110  11/05/18   0621   INR  1.1  1.0       Objective:   Vitals: BP (!) 101/58   Pulse 72   Temp 97.9 °F (36.6 °C) (Core)   Resp 20   Ht 5' 11\" (1.803 m)   Wt (!) 327 lb 4.8 oz (148.5 kg)   SpO2 97%   BMI 45.65 kg/m²       Constitutional and General Appearance: intubated and sedated, parlayzed. Eyes:  sclera anicteric, left eye normal, right eye normal  Mouth, Throat:  ET tube noted along with OG. Tongue bite anterior tongue. Cardiovascular:  regular rate and rhythm, S1, S2 normal, no S3 or S4, no click and no rub  Respiratory:  b/l air entry present. Gastrointestinal:  soft, obese  Extremities: No calf assymetry. Lower extremity edema none bilateral. Groin site on the right intact without any bleeding. Musculoskeletal:  no joint tenderness, deformity or swelling, no muscular tenderness noted. Skin:  dry and anicteric.   Neurologic:  not examined;

## 2018-11-07 ENCOUNTER — APPOINTMENT (OUTPATIENT)
Dept: GENERAL RADIOLOGY | Age: 58
DRG: 246 | End: 2018-11-07
Payer: COMMERCIAL

## 2018-11-07 LAB
ABSOLUTE EOS #: <0.03 K/UL (ref 0–0.44)
ABSOLUTE IMMATURE GRANULOCYTE: 0.07 K/UL (ref 0–0.3)
ABSOLUTE LYMPH #: 1.5 K/UL (ref 1.1–3.7)
ABSOLUTE MONO #: 1.07 K/UL (ref 0.1–1.2)
ANION GAP SERPL CALCULATED.3IONS-SCNC: 12 MMOL/L (ref 9–17)
ANION GAP SERPL CALCULATED.3IONS-SCNC: 15 MMOL/L (ref 9–17)
BASOPHILS # BLD: 0 % (ref 0–2)
BASOPHILS ABSOLUTE: 0.05 K/UL (ref 0–0.2)
BUN BLDV-MCNC: 49 MG/DL (ref 6–20)
BUN/CREAT BLD: ABNORMAL (ref 9–20)
CALCIUM SERPL-MCNC: 8.1 MG/DL (ref 8.6–10.4)
CHLORIDE BLD-SCNC: 106 MMOL/L (ref 98–107)
CHLORIDE BLD-SCNC: 107 MMOL/L (ref 98–107)
CO2: 21 MMOL/L (ref 20–31)
CO2: 22 MMOL/L (ref 20–31)
CREAT SERPL-MCNC: 3.65 MG/DL (ref 0.7–1.2)
DIFFERENTIAL TYPE: ABNORMAL
EKG ATRIAL RATE: 79 BPM
EKG ATRIAL RATE: 82 BPM
EKG P AXIS: 21 DEGREES
EKG P AXIS: 60 DEGREES
EKG P-R INTERVAL: 218 MS
EKG P-R INTERVAL: 220 MS
EKG Q-T INTERVAL: 356 MS
EKG Q-T INTERVAL: 376 MS
EKG QRS DURATION: 130 MS
EKG QRS DURATION: 144 MS
EKG QTC CALCULATION (BAZETT): 415 MS
EKG QTC CALCULATION (BAZETT): 431 MS
EKG R AXIS: 11 DEGREES
EKG R AXIS: 25 DEGREES
EKG T AXIS: -138 DEGREES
EKG T AXIS: 43 DEGREES
EKG VENTRICULAR RATE: 79 BPM
EKG VENTRICULAR RATE: 82 BPM
EOSINOPHILS RELATIVE PERCENT: 0 % (ref 1–4)
GFR AFRICAN AMERICAN: 21 ML/MIN
GFR NON-AFRICAN AMERICAN: 17 ML/MIN
GFR SERPL CREATININE-BSD FRML MDRD: ABNORMAL ML/MIN/{1.73_M2}
GFR SERPL CREATININE-BSD FRML MDRD: ABNORMAL ML/MIN/{1.73_M2}
GLUCOSE BLD-MCNC: 104 MG/DL (ref 75–110)
GLUCOSE BLD-MCNC: 112 MG/DL (ref 75–110)
GLUCOSE BLD-MCNC: 89 MG/DL (ref 75–110)
GLUCOSE BLD-MCNC: 98 MG/DL (ref 70–99)
HCT VFR BLD CALC: 30.5 % (ref 40.7–50.3)
HCT VFR BLD CALC: 32.8 % (ref 40.7–50.3)
HEMOGLOBIN: 10 G/DL (ref 13–17)
HEMOGLOBIN: 10.5 G/DL (ref 13–17)
IMMATURE GRANULOCYTES: 1 %
LYMPHOCYTES # BLD: 13 % (ref 24–43)
MAGNESIUM: 1.8 MG/DL (ref 1.6–2.6)
MAGNESIUM: 2.1 MG/DL (ref 1.6–2.6)
MCH RBC QN AUTO: 28.8 PG (ref 25.2–33.5)
MCH RBC QN AUTO: 29.1 PG (ref 25.2–33.5)
MCHC RBC AUTO-ENTMCNC: 32 G/DL (ref 28.4–34.8)
MCHC RBC AUTO-ENTMCNC: 32.8 G/DL (ref 28.4–34.8)
MCV RBC AUTO: 88.7 FL (ref 82.6–102.9)
MCV RBC AUTO: 89.9 FL (ref 82.6–102.9)
MONOCYTES # BLD: 10 % (ref 3–12)
NRBC AUTOMATED: 0 PER 100 WBC
NRBC AUTOMATED: 0 PER 100 WBC
PDW BLD-RTO: 13.3 % (ref 11.8–14.4)
PDW BLD-RTO: 13.6 % (ref 11.8–14.4)
PLATELET # BLD: 159 K/UL (ref 138–453)
PLATELET # BLD: 174 K/UL (ref 138–453)
PLATELET ESTIMATE: ABNORMAL
PMV BLD AUTO: 10.3 FL (ref 8.1–13.5)
PMV BLD AUTO: 10.4 FL (ref 8.1–13.5)
POTASSIUM SERPL-SCNC: 3.1 MMOL/L (ref 3.7–5.3)
POTASSIUM SERPL-SCNC: 3.5 MMOL/L (ref 3.7–5.3)
RBC # BLD: 3.44 M/UL (ref 4.21–5.77)
RBC # BLD: 3.65 M/UL (ref 4.21–5.77)
RBC # BLD: ABNORMAL 10*6/UL
SEG NEUTROPHILS: 76 % (ref 36–65)
SEGMENTED NEUTROPHILS ABSOLUTE COUNT: 8.59 K/UL (ref 1.5–8.1)
SODIUM BLD-SCNC: 140 MMOL/L (ref 135–144)
SODIUM BLD-SCNC: 143 MMOL/L (ref 135–144)
WBC # BLD: 11.3 K/UL (ref 3.5–11.3)
WBC # BLD: 11.8 K/UL (ref 3.5–11.3)
WBC # BLD: ABNORMAL 10*3/UL

## 2018-11-07 PROCEDURE — 83735 ASSAY OF MAGNESIUM: CPT

## 2018-11-07 PROCEDURE — C9113 INJ PANTOPRAZOLE SODIUM, VIA: HCPCS | Performed by: INTERNAL MEDICINE

## 2018-11-07 PROCEDURE — G8978 MOBILITY CURRENT STATUS: HCPCS

## 2018-11-07 PROCEDURE — 97530 THERAPEUTIC ACTIVITIES: CPT

## 2018-11-07 PROCEDURE — 80048 BASIC METABOLIC PNL TOTAL CA: CPT

## 2018-11-07 PROCEDURE — 6370000000 HC RX 637 (ALT 250 FOR IP): Performed by: STUDENT IN AN ORGANIZED HEALTH CARE EDUCATION/TRAINING PROGRAM

## 2018-11-07 PROCEDURE — 2700000000 HC OXYGEN THERAPY PER DAY

## 2018-11-07 PROCEDURE — 6370000000 HC RX 637 (ALT 250 FOR IP): Performed by: INTERNAL MEDICINE

## 2018-11-07 PROCEDURE — 97535 SELF CARE MNGMENT TRAINING: CPT

## 2018-11-07 PROCEDURE — 6370000000 HC RX 637 (ALT 250 FOR IP): Performed by: HOSPITALIST

## 2018-11-07 PROCEDURE — 97162 PT EVAL MOD COMPLEX 30 MIN: CPT

## 2018-11-07 PROCEDURE — 6360000002 HC RX W HCPCS: Performed by: INTERNAL MEDICINE

## 2018-11-07 PROCEDURE — 85025 COMPLETE CBC W/AUTO DIFF WBC: CPT

## 2018-11-07 PROCEDURE — 94640 AIRWAY INHALATION TREATMENT: CPT

## 2018-11-07 PROCEDURE — 2580000003 HC RX 258: Performed by: INTERNAL MEDICINE

## 2018-11-07 PROCEDURE — 94760 N-INVAS EAR/PLS OXIMETRY 1: CPT

## 2018-11-07 PROCEDURE — G8979 MOBILITY GOAL STATUS: HCPCS

## 2018-11-07 PROCEDURE — 99232 SBSQ HOSP IP/OBS MODERATE 35: CPT | Performed by: INTERNAL MEDICINE

## 2018-11-07 PROCEDURE — 71045 X-RAY EXAM CHEST 1 VIEW: CPT

## 2018-11-07 PROCEDURE — 80051 ELECTROLYTE PANEL: CPT

## 2018-11-07 PROCEDURE — 82947 ASSAY GLUCOSE BLOOD QUANT: CPT

## 2018-11-07 PROCEDURE — 97166 OT EVAL MOD COMPLEX 45 MIN: CPT

## 2018-11-07 PROCEDURE — 2060000000 HC ICU INTERMEDIATE R&B

## 2018-11-07 PROCEDURE — 94660 CPAP INITIATION&MGMT: CPT

## 2018-11-07 PROCEDURE — 85027 COMPLETE CBC AUTOMATED: CPT

## 2018-11-07 PROCEDURE — 36415 COLL VENOUS BLD VENIPUNCTURE: CPT

## 2018-11-07 PROCEDURE — G8988 SELF CARE GOAL STATUS: HCPCS

## 2018-11-07 PROCEDURE — G8987 SELF CARE CURRENT STATUS: HCPCS

## 2018-11-07 RX ORDER — ISOSORBIDE DINITRATE 10 MG/1
5 TABLET ORAL 3 TIMES DAILY
Status: DISCONTINUED | OUTPATIENT
Start: 2018-11-07 | End: 2018-11-10 | Stop reason: HOSPADM

## 2018-11-07 RX ORDER — HYDRALAZINE HYDROCHLORIDE 10 MG/1
5 TABLET, FILM COATED ORAL EVERY 8 HOURS SCHEDULED
Status: DISCONTINUED | OUTPATIENT
Start: 2018-11-07 | End: 2018-11-10 | Stop reason: HOSPADM

## 2018-11-07 RX ADMIN — TICAGRELOR 90 MG: 90 TABLET ORAL at 20:44

## 2018-11-07 RX ADMIN — ISOSORBIDE DINITRATE 5 MG: 10 TABLET ORAL at 14:30

## 2018-11-07 RX ADMIN — HYDRALAZINE HYDROCHLORIDE 5 MG: 10 TABLET, FILM COATED ORAL at 21:30

## 2018-11-07 RX ADMIN — Medication 10 ML: at 20:51

## 2018-11-07 RX ADMIN — ATORVASTATIN CALCIUM 80 MG: 80 TABLET, FILM COATED ORAL at 20:44

## 2018-11-07 RX ADMIN — AMPICILLIN SODIUM AND SULBACTAM SODIUM 1.5 G: 1; .5 INJECTION, POWDER, FOR SOLUTION INTRAMUSCULAR; INTRAVENOUS at 14:15

## 2018-11-07 RX ADMIN — POTASSIUM CHLORIDE 40 MEQ: 40 SOLUTION ORAL at 11:54

## 2018-11-07 RX ADMIN — AMPICILLIN SODIUM AND SULBACTAM SODIUM 1.5 G: 1; .5 INJECTION, POWDER, FOR SOLUTION INTRAMUSCULAR; INTRAVENOUS at 20:50

## 2018-11-07 RX ADMIN — ISOSORBIDE DINITRATE 5 MG: 10 TABLET ORAL at 20:44

## 2018-11-07 RX ADMIN — ASPIRIN 81 MG: 81 TABLET, CHEWABLE ORAL at 09:08

## 2018-11-07 RX ADMIN — AMPICILLIN SODIUM AND SULBACTAM SODIUM 1.5 G: 1; .5 INJECTION, POWDER, FOR SOLUTION INTRAMUSCULAR; INTRAVENOUS at 03:21

## 2018-11-07 RX ADMIN — AMPICILLIN SODIUM AND SULBACTAM SODIUM 1.5 G: 1; .5 INJECTION, POWDER, FOR SOLUTION INTRAMUSCULAR; INTRAVENOUS at 09:00

## 2018-11-07 RX ADMIN — PANTOPRAZOLE SODIUM 40 MG: 40 INJECTION, POWDER, FOR SOLUTION INTRAVENOUS at 10:01

## 2018-11-07 RX ADMIN — MAGNESIUM SULFATE HEPTAHYDRATE 1 G: 1 INJECTION, SOLUTION INTRAVENOUS at 11:54

## 2018-11-07 RX ADMIN — HYDRALAZINE HYDROCHLORIDE 5 MG: 10 TABLET, FILM COATED ORAL at 15:35

## 2018-11-07 RX ADMIN — METOPROLOL TARTRATE 25 MG: 25 TABLET ORAL at 20:44

## 2018-11-07 RX ADMIN — Medication 10 ML: at 09:51

## 2018-11-07 RX ADMIN — METOPROLOL TARTRATE 25 MG: 25 TABLET ORAL at 09:09

## 2018-11-07 RX ADMIN — POTASSIUM CHLORIDE 40 MEQ: 40 SOLUTION ORAL at 20:44

## 2018-11-07 RX ADMIN — TICAGRELOR 90 MG: 90 TABLET ORAL at 09:09

## 2018-11-07 RX ADMIN — ALBUTEROL SULFATE 2 PUFF: 90 AEROSOL, METERED RESPIRATORY (INHALATION) at 08:33

## 2018-11-07 ASSESSMENT — PAIN DESCRIPTION - PAIN TYPE
TYPE: ACUTE PAIN
TYPE: ACUTE PAIN

## 2018-11-07 ASSESSMENT — PAIN SCALES - GENERAL
PAINLEVEL_OUTOF10: 0
PAINLEVEL_OUTOF10: 9
PAINLEVEL_OUTOF10: 9

## 2018-11-07 ASSESSMENT — PAIN DESCRIPTION - LOCATION
LOCATION: CHEST
LOCATION: CHEST

## 2018-11-07 ASSESSMENT — PAIN DESCRIPTION - DESCRIPTORS: DESCRIPTORS: DISCOMFORT

## 2018-11-07 NOTE — PROGRESS NOTES
PULMONARY & CRITICAL CARE DAILY PROGRESS NOTE      Patient:  Tenisha Brandt  MRN: [id number]  Admit date: 11/3/2018    Primary Care Physician: Joy Saxena MD  Consulting Physician: Bernardo Castañeda MD  Reason for Consult: Vent management   Current Evaluation 11/07/18  Patient sitting comfortably on chair   A&O -3  Patient on 3L NC   Patient used BIPAP last night   Patient desaturated this morning     Intake/Output Summary (Last 24 hours) at 11/07/18 1212  Last data filed at 11/07/18 0600   Gross per 24 hour   Intake           3082.6 ml   Output             1785 ml   Net           1297.6 ml           C  HISTORY OF PRESENT ILLNESS:   The patient is a 66 y.o. male who presented to the [address] [place] [address] emergency room after a witnessed cardiac arrest at a hockey game. Chart review and notes patient was noted to be grabbing his chest and he became unresponsive and CPR was started immediately as there was police and EMS near the hockey game venue. Patient was noted to be in V. fib and required multiple shocks as documented around 6 and multiple rounds of CPR and IV pushes of amiodarone. On presenting to the emergency room patient was also found to be in V. fib arrest and was shocked twice. There was concern for PE and patient got a CTA to rule out pulmonary embolism which was negative for pulmonary embolism. Patient was taken for emergent cath. And in the cath it was seen that patient had left coronary artery disease and diagonal for which 2 stents were placed. Patient was started on hypothermia protocol. Patient was initially sedated on propofol and at night a rapid response was called due to patient's blood pressure dropping and patient had to be switched on Versed for sedation required 1 L of fluid bolus and did not require any pressors. Currently on my evaluation patient is hemodynamically stable, on hypothermia protocol and Versed for sedation.     PAST MEDICAL HISTORY:        Diagnosis Date    Hypertension PAST SURGICAL HISTORY:        Procedure Laterality Date    HERNIA REPAIR Left 2008    HERNIA REPAIR       FAMILY HISTORY:   Family History   Problem Relation Age of Onset    Diabetes Mother     Heart Disease Mother      ALLERGIES:    No Known Allergies    HOME MEDICATIONS:  Prior to Admission medications    Medication Sig Start Date End Date Taking? Authorizing Provider   aspirin 81 MG EC tablet Take 81 mg by mouth daily   Yes Historical Provider, MD   Cyanocobalamin (B-12) 1000 MCG TABS Take 1 tablet by mouth daily   Yes Historical Provider, MD   Riboflavin (B-2) 100 MG TABS Take 1 tablet by mouth daily   Yes Historical Provider, MD   losartan-hydrochlorothiazide (HYZAAR) 100-25 MG per tablet Take 1 tablet by mouth daily   Yes Historical Provider, MD   folic acid (FOLVITE) 844 MCG tablet Take 800 mcg by mouth daily   Yes Historical Provider, MD   ranitidine (RANITIDINE 150 MAX STRENGTH) 150 MG tablet Take 150 mg by mouth 2 times daily as needed for Heartburn   Yes Historical Provider, MD   Ferrous Gluconate (IRON 27 PO) Take 1 tablet by mouth daily   Yes Historical Provider, MD   amLODIPine (NORVASC) 10 MG tablet Take 10 mg by mouth daily   Yes Historical Provider, MD   carvedilol (COREG) 25 MG tablet Take 25 mg by mouth 2 times daily (with meals)   Yes Historical Provider, MD     IMMUNIZATIONS:    IREVIEW OF SYSTEMS:  Not obtained due to patient being intubated and sedated     PHYSICAL EXAMINATION     VITAL SIGNS:   BP (!) 144/71   Pulse 94   Temp 97.8 °F (36.6 °C) (Oral)   Resp 24   Ht 5' 11\" (1.803 m)   Wt (!) 328 lb 14.4 oz (149.2 kg)   SpO2 95%   BMI 45.87 kg/m²   SYSTEMIC EXAMINATION:   General:          Intubated and sedated. Neck:               No JVD, no thyromegaly, no lymphadenopathy. Chest:              Bilateral vesicular breath sounds, no rales or wheezes. Cardiac:           S1 S2 RR, no murmurs, gallops or rubs, JVP not raised.   Abdomen:        Soft, non-tender, non distended, BS

## 2018-11-07 NOTE — PROGRESS NOTES
less than 80 percent impaired, limited or restricted  Mobility: Walking and Moving Around Goal Status (): At least 1 percent but less than 20 percent impaired, limited or restricted  OutComes Score                                           AM-PAC Score     AM-PAC Inpatient Mobility without Stair Climbing Raw Score : 10  AM-PAC Inpatient without Stair Climbing T-Scale Score : 34.07  Mobility Inpatient CMS 0-100% Score: 71.66  Mobility Inpatient without Stair CMS G-Code Modifier : CL       Goals  Short term goals  Time Frame for Short term goals: 15  Short term goal 1: Pt to demonstrate bed mobility CGA  Short term goal 2: Pt to perform functional transfers CGA  Short term goal 3: Pt to amb 300ft w/least restrictive AD CGA  Short term goal 4: Pt to tolerate 45 minutes of therapy to demo increased endurance   Short term goal 5: Ascend/descend 2 stairs w/ no rail CGA  Patient Goals   Patient goals : Did not state       Therapy Time   Individual Concurrent Group Co-treatment   Time In 1001         Time Out 1043         Minutes 42         Timed Code Treatment Minutes: 25 Minutes       YADIRA Crowell  Evaluation/treatment performed by Student PT under the supervision of co-signing PT who agrees with all evaluation/treatment and documentation.

## 2018-11-07 NOTE — PROGRESS NOTES
Occupational Therapy   Occupational Therapy Initial Assessment  Date: 2018   Patient Name: Josi Vargas  MRN: [id number]     : 1960     The patient is a 66 y. o. male who presented to the [address] [place] [address] emergency room after a witnessed cardiac arrest at a hockey game. Nilsa Matthew review and notes patient was noted to be grabbing his chest and he became unresponsive and CPR was started immediately as there was police and EMS near the hockey game venue. Jono New was noted to be in V. fib and required multiple shocks    Date of Service: 2018    Discharge Recommendations:  IP Rehab   In my professional opinion, this patient could tolerate a total of 3 hours of combined therapies post-acute care. OT Equipment Recommendations  Equipment Needed: No    Patient Diagnosis(es): The encounter diagnosis was Cardiac arrest ([facility] [address] has a past medical history of Hypertension. has a past surgical history that includes Hernia repair (Left, ) and hernia repair.     Restrictions  Restrictions/Precautions  Restrictions/Precautions: Fall Risk, Up as Tolerated  Required Braces or Orthoses?: No    Subjective   General  Patient assessed for rehabilitation services?: Yes  Family / Caregiver Present: Yes (wife and daughter)  Pain Assessment  Patient Currently in Pain: Yes  Pain Assessment: 0-10  Pain Level: 9  Pain Type: Acute pain  Pain Location: Chest  Pain Descriptors: Discomfort  Pain Intervention(s): Repositioned, Ambulation/Increased activity  Response to Pain Intervention: Patient Satisfied    Social/Functional History  Social/Functional History  Lives With: Family  Type of Home: House  Home Layout: Two level, Bed/Bath upstairs, 1/2 bath on main level (full flight to get upstairs )  Home Access: Stairs to enter without rails  Entrance Stairs - Number of Steps: 2 small steps   Bathroom Shower/Tub: Walk-in shower  Bathroom Toilet: Standard  Bathroom Equipment: Grab bars around toilet (none )  Home Equipment: Rolling

## 2018-11-07 NOTE — PLAN OF CARE
Problem: RESPIRATORY  Intervention: Respiratory assessment  PRN FOR BRONCHOSPASM/BRONCHOCONSTRICTION     [x]         IMPROVE AERATION/BREATH SOUNDS  [x]   ADMINISTER BRONCHODILATOR THERAPY AS APPROPRIATE  [x]   ASSESS BREATH SOUNDS  []   IMPLEMENT AEROSOL/MDI PROTOCOL  [x]   PATIENT EDUCATION AS NEEDED    PROVIDE ADEQUATE OXYGENATION WITH ACCEPTABLE SP02/ABG'S    [x]  IDENTIFY APPROPRIATE OXYGEN THERAPY  [x]   MONITOR SP02/ABG'S AS NEEDED   [x]   PATIENT EDUCATION AS NEEDED    NONINVASIVE VENTILATION    PROVIDE OPTIMAL VENTILATION/ACCEPTABLE SPO2   IMPLEMENT NONINVASIVE VENTILATION PROTOCOL   MAINTAIN ACCEPTABLE SPO2   ASSESS SKIN INTEGRITY/BREAKDOWN SCORE   PATIENT EDUCATION AS NEEDED   BIPAP AS NEEDED

## 2018-11-07 NOTE — PROGRESS NOTES
in this interval not displayed. Hepatic:   Recent Labs      11/05/18   0007   AST  268*   ALT  521*   BILITOT  1.15   ALKPHOS  70     Troponin: No results for input(s): TROPONINI in the last 72 hours. Recent Labs      11/04/18   1222  11/04/18   1749  11/05/18   0007   TROPONINT  4.66*  3.20*  2.23*     BNP: No results for input(s): PROBNP in the last 72 hours. No results for input(s): BNP in the last 72 hours. Lipids:   No results for input(s): CHOL, HDL in the last 72 hours. Invalid input(s): LDLCALCU  INR:   Recent Labs      11/05/18   0621   INR  1.0       Objective:   Vitals: BP (!) 144/71   Pulse 94   Temp 97.8 °F (36.6 °C) (Oral)   Resp 24   Ht 5' 11\" (1.803 m)   Wt (!) 328 lb 14.4 oz (149.2 kg)   SpO2 95%   BMI 45.87 kg/m²       Constitutional and General Appearance: in chair  Eyes:  sclera anicteric, left eye normal, right eye normal  Cardiovascular:  regular rate and rhythm, S1, S2 normal, no S3 or S4, no click and no rub  Respiratory:  b/l air entry present. Gastrointestinal:  soft, obese  Extremities: No calf assymetry. Lower extremity edema none bilateral. Groin site on the right intact without any bleeding. Musculoskeletal:  no joint tenderness, deformity or swelling, no muscular tenderness noted. Skin:  dry and anicteric. Neurologic:  not examined; paralyzed. Psychiatric:  Intubated and sedated. Diagnostic Studies:     EKG 11/3 : SR with 1st degree AV block. Anteroseptal injury pattern. IVCD. ECHO: 11/4  Mild concentric left ventricular hypertrophy. Evidence of diastolic dysfunction. Severely reduced left ventricular function. Ejection fraction by Curtis's measured 13%    CTa chest: 11/3  Negative CTA for pulmonary embolus. NG tube extends into the airway terminated in the bronchus intermedius.    Repositioning is recommended.  This finding was discussed with the patient's   nurse Veronika on 11/03/2018 at 2340 hours   Bilateral lung infiltrates questioning aspiration   Bilateral anterolateral rib fractures likely related to the CPR.       Cath 11/3/18  LMCA: Normal 0% stenosis. LAD: Proximal- mid 99%, mid 90% and distal 100% stenosis. D1: 100% stenosis  LCx: Dominant. Minimal 25% stenosis. OPm1: Mid 40% stenosis. OM2: 20% stenosis  PDA: Minimal 20% stenosis. RCA: Non dominant, Ostial 99% and mid 50% stenosis    Successful PTCA -ROSALIA LAD, distal, mid and proximal-mid area         Patient's Active Problem List   Active Problems:    Cardiac arrest [facility])    Ventricular fibrillation (HCC)    Aspiration pneumonia of both upper lobes (HCC)  Resolved Problems:    * No resolved hospital problems. *        Assessment / Acute Cardiac Problems:   1. V Fib Cardiac Arrest s/p hypothermia protocol - now in chair and talking  2. Anteroseptal STEMI s/p PCI of the LAD and Diagonal with ROSALIA with severe stenosis residual in the RCA  3. Ischemic Cardiomyopathy with EF 13%  4. Gram positive streptococci in sputum  5. Acute Kidney Injury   6. Bleeding from the mouth secondary to tongue biting post intubation (patient was agitated biting the ET tube)      Plan of Treatment:     1. Off vent   2. Continue ASA, Brilinta, Lipitor, BB  3. No ACE/ARB/Entresto/Adlactone due to AK  4. Add Hydral/isodil for afterload reduction as BP tolerates  5. Nephrology following for CARMINE- on gentle IVF  6. On antibiotics for positive sputum cultures  7. Will need LifeVest prior to discharge- will have EP see tomorrow to assess if AICD candidate- I suspect he is not as this occurred during the time of STEMI. Also patient needs staged PCI in 4-6 weeks for RCA- which can potentially allow for recovery of LVEF. Thank you for allowing me to participate in the care of this patient, please do not hesitate to call if you have any questions. Betty Alfaro DO, [address], [place] [address] [facility]  [facility]. [facility]  [phone]

## 2018-11-07 NOTE — PROGRESS NOTES
NEPHROLOGY PROGRESS NOTE      SUBJECTIVE     Extubated. BP stable. Tolerating PO but appetite poor. No dysarrthmia. Decent diuresis and renal function seems to be plateauing  On Abx for aspiration pneumonia    OBJECTIVE     Vitals:    11/07/18 0500 11/07/18 0600 11/07/18 0700 11/07/18 0833   BP: 117/60 138/79 (!) 144/71    Pulse: 85 92 94    Resp: 20 24 24    Temp: 99.9 °F (37.7 °C) 100 °F (37.8 °C) 97.8 °F (36.6 °C)    TempSrc: Core Core Oral    SpO2: 94% 96% 94% 95%   Weight:  (!) 328 lb 14.4 oz (149.2 kg)     Height:  5' 11\" (1.803 m)       24HR INTAKE/OUTPUT:      Intake/Output Summary (Last 24 hours) at 11/07/18 1133  Last data filed at 11/07/18 0600   Gross per 24 hour   Intake           3082.6 ml   Output             2010 ml   Net           1072.6 ml       General appearance:awake and alert  Respiratory::vesicular breath sounds,no wheeze/crackles  Cardiovascular:S1 S2 normal,no gallop or organic murmur. Abdomen:Non tender/non distended. Bowel sounds present. Sacrotal swelling  Extremities: No Cyanosis or Clubbing,Lower extremity edema  Neurological:awake and alert      MEDICATIONS     Scheduled Meds:    hydrALAZINE  5 mg Oral 3 times per day    isosorbide dinitrate  5 mg Oral TID    metoprolol tartrate  25 mg Oral BID    ampicillin-sulbactam  1.5 g Intravenous Q6H    sodium chloride flush  10 mL Intravenous 2 times per day    atorvastatin  80 mg Oral Nightly    aspirin  81 mg Oral Daily    ticagrelor  90 mg Oral BID    insulin lispro  0-6 Units Subcutaneous 4x Daily AC & HS    insulin lispro  0-3 Units Subcutaneous Nightly    influenza virus vaccine  0.5 mL Intramuscular Once    pantoprazole  40 mg Intravenous Daily    And    sodium chloride (PF)  10 mL Intravenous Daily     Continuous Infusions:    sodium chloride 100 mL/hr at 11/06/18 1526    dextrose       PRN Meds:  metoprolol, albuterol sulfate HFA, potassium chloride **OR** potassium chloride **OR** potassium chloride, diphenhydrAMINE,

## 2018-11-08 ENCOUNTER — APPOINTMENT (OUTPATIENT)
Dept: GENERAL RADIOLOGY | Age: 58
DRG: 246 | End: 2018-11-08
Payer: COMMERCIAL

## 2018-11-08 LAB
ABSOLUTE EOS #: <0.03 K/UL (ref 0–0.44)
ABSOLUTE IMMATURE GRANULOCYTE: 0.1 K/UL (ref 0–0.3)
ABSOLUTE LYMPH #: 1.44 K/UL (ref 1.1–3.7)
ABSOLUTE MONO #: 1.16 K/UL (ref 0.1–1.2)
ANION GAP SERPL CALCULATED.3IONS-SCNC: 13 MMOL/L (ref 9–17)
BASOPHILS # BLD: 1 % (ref 0–2)
BASOPHILS ABSOLUTE: 0.06 K/UL (ref 0–0.2)
BUN BLDV-MCNC: 42 MG/DL (ref 6–20)
BUN/CREAT BLD: ABNORMAL (ref 9–20)
CALCIUM SERPL-MCNC: 8.3 MG/DL (ref 8.6–10.4)
CHLORIDE BLD-SCNC: 104 MMOL/L (ref 98–107)
CO2: 22 MMOL/L (ref 20–31)
CREAT SERPL-MCNC: 3.28 MG/DL (ref 0.7–1.2)
CULTURE: ABNORMAL
CULTURE: ABNORMAL
DIFFERENTIAL TYPE: ABNORMAL
DIRECT EXAM: ABNORMAL
EOSINOPHILS RELATIVE PERCENT: 0 % (ref 1–4)
GFR AFRICAN AMERICAN: 24 ML/MIN
GFR NON-AFRICAN AMERICAN: 20 ML/MIN
GFR SERPL CREATININE-BSD FRML MDRD: ABNORMAL ML/MIN/{1.73_M2}
GFR SERPL CREATININE-BSD FRML MDRD: ABNORMAL ML/MIN/{1.73_M2}
GLUCOSE BLD-MCNC: 101 MG/DL (ref 75–110)
GLUCOSE BLD-MCNC: 108 MG/DL (ref 70–99)
GLUCOSE BLD-MCNC: 110 MG/DL (ref 75–110)
GLUCOSE BLD-MCNC: 111 MG/DL (ref 75–110)
GLUCOSE BLD-MCNC: 113 MG/DL (ref 75–110)
HCT VFR BLD CALC: 31.7 % (ref 40.7–50.3)
HEMOGLOBIN: 10.2 G/DL (ref 13–17)
IMMATURE GRANULOCYTES: 1 %
LYMPHOCYTES # BLD: 13 % (ref 24–43)
Lab: ABNORMAL
MCH RBC QN AUTO: 28.8 PG (ref 25.2–33.5)
MCHC RBC AUTO-ENTMCNC: 32.2 G/DL (ref 28.4–34.8)
MCV RBC AUTO: 89.5 FL (ref 82.6–102.9)
MONOCYTES # BLD: 11 % (ref 3–12)
NRBC AUTOMATED: 0 PER 100 WBC
ORGANISM: ABNORMAL
PDW BLD-RTO: 13.3 % (ref 11.8–14.4)
PLATELET # BLD: 180 K/UL (ref 138–453)
PLATELET ESTIMATE: ABNORMAL
PMV BLD AUTO: 10.1 FL (ref 8.1–13.5)
POTASSIUM SERPL-SCNC: 3.4 MMOL/L (ref 3.7–5.3)
RBC # BLD: 3.54 M/UL (ref 4.21–5.77)
RBC # BLD: ABNORMAL 10*6/UL
SEG NEUTROPHILS: 74 % (ref 36–65)
SEGMENTED NEUTROPHILS ABSOLUTE COUNT: 8.09 K/UL (ref 1.5–8.1)
SODIUM BLD-SCNC: 139 MMOL/L (ref 135–144)
SPECIMEN DESCRIPTION: ABNORMAL
STATUS: ABNORMAL
WBC # BLD: 10.9 K/UL (ref 3.5–11.3)
WBC # BLD: ABNORMAL 10*3/UL

## 2018-11-08 PROCEDURE — 6370000000 HC RX 637 (ALT 250 FOR IP): Performed by: INTERNAL MEDICINE

## 2018-11-08 PROCEDURE — 99223 1ST HOSP IP/OBS HIGH 75: CPT | Performed by: PHYSICAL MEDICINE & REHABILITATION

## 2018-11-08 PROCEDURE — 84244 ASSAY OF RENIN: CPT

## 2018-11-08 PROCEDURE — 80048 BASIC METABOLIC PNL TOTAL CA: CPT

## 2018-11-08 PROCEDURE — 99232 SBSQ HOSP IP/OBS MODERATE 35: CPT | Performed by: INTERNAL MEDICINE

## 2018-11-08 PROCEDURE — 71045 X-RAY EXAM CHEST 1 VIEW: CPT

## 2018-11-08 PROCEDURE — 2580000003 HC RX 258: Performed by: INTERNAL MEDICINE

## 2018-11-08 PROCEDURE — C9113 INJ PANTOPRAZOLE SODIUM, VIA: HCPCS | Performed by: INTERNAL MEDICINE

## 2018-11-08 PROCEDURE — 97110 THERAPEUTIC EXERCISES: CPT

## 2018-11-08 PROCEDURE — 6370000000 HC RX 637 (ALT 250 FOR IP): Performed by: STUDENT IN AN ORGANIZED HEALTH CARE EDUCATION/TRAINING PROGRAM

## 2018-11-08 PROCEDURE — 97116 GAIT TRAINING THERAPY: CPT

## 2018-11-08 PROCEDURE — 6360000002 HC RX W HCPCS: Performed by: INTERNAL MEDICINE

## 2018-11-08 PROCEDURE — 85025 COMPLETE CBC W/AUTO DIFF WBC: CPT

## 2018-11-08 PROCEDURE — 82088 ASSAY OF ALDOSTERONE: CPT

## 2018-11-08 PROCEDURE — 2060000000 HC ICU INTERMEDIATE R&B

## 2018-11-08 PROCEDURE — 36415 COLL VENOUS BLD VENIPUNCTURE: CPT

## 2018-11-08 PROCEDURE — 82947 ASSAY GLUCOSE BLOOD QUANT: CPT

## 2018-11-08 PROCEDURE — 94760 N-INVAS EAR/PLS OXIMETRY 1: CPT

## 2018-11-08 RX ORDER — AMLODIPINE BESYLATE 5 MG/1
5 TABLET ORAL DAILY
Status: DISCONTINUED | OUTPATIENT
Start: 2018-11-08 | End: 2018-11-10 | Stop reason: HOSPADM

## 2018-11-08 RX ORDER — CARVEDILOL 25 MG/1
25 TABLET ORAL 2 TIMES DAILY
Status: DISCONTINUED | OUTPATIENT
Start: 2018-11-08 | End: 2018-11-10 | Stop reason: HOSPADM

## 2018-11-08 RX ADMIN — TICAGRELOR 90 MG: 90 TABLET ORAL at 20:32

## 2018-11-08 RX ADMIN — AMLODIPINE BESYLATE 5 MG: 5 TABLET ORAL at 12:35

## 2018-11-08 RX ADMIN — ISOSORBIDE DINITRATE 5 MG: 10 TABLET ORAL at 20:33

## 2018-11-08 RX ADMIN — Medication 10 ML: at 20:37

## 2018-11-08 RX ADMIN — AMPICILLIN SODIUM AND SULBACTAM SODIUM 1.5 G: 1; .5 INJECTION, POWDER, FOR SOLUTION INTRAMUSCULAR; INTRAVENOUS at 07:59

## 2018-11-08 RX ADMIN — Medication 10 ML: at 07:59

## 2018-11-08 RX ADMIN — METOPROLOL TARTRATE 25 MG: 25 TABLET ORAL at 07:59

## 2018-11-08 RX ADMIN — ISOSORBIDE DINITRATE 5 MG: 10 TABLET ORAL at 07:59

## 2018-11-08 RX ADMIN — AMPICILLIN SODIUM AND SULBACTAM SODIUM 1.5 G: 1; .5 INJECTION, POWDER, FOR SOLUTION INTRAMUSCULAR; INTRAVENOUS at 16:36

## 2018-11-08 RX ADMIN — CARVEDILOL 25 MG: 25 TABLET, FILM COATED ORAL at 20:32

## 2018-11-08 RX ADMIN — AMPICILLIN SODIUM AND SULBACTAM SODIUM 1.5 G: 1; .5 INJECTION, POWDER, FOR SOLUTION INTRAMUSCULAR; INTRAVENOUS at 02:30

## 2018-11-08 RX ADMIN — ISOSORBIDE DINITRATE 5 MG: 10 TABLET ORAL at 16:37

## 2018-11-08 RX ADMIN — Medication 10 ML: at 08:00

## 2018-11-08 RX ADMIN — POTASSIUM CHLORIDE 40 MEQ: 40 SOLUTION ORAL at 07:05

## 2018-11-08 RX ADMIN — HYDRALAZINE HYDROCHLORIDE 5 MG: 10 TABLET, FILM COATED ORAL at 05:00

## 2018-11-08 RX ADMIN — TICAGRELOR 90 MG: 90 TABLET ORAL at 07:59

## 2018-11-08 RX ADMIN — ASPIRIN 81 MG: 81 TABLET, CHEWABLE ORAL at 08:00

## 2018-11-08 RX ADMIN — ATORVASTATIN CALCIUM 80 MG: 80 TABLET, FILM COATED ORAL at 20:32

## 2018-11-08 RX ADMIN — AMPICILLIN SODIUM AND SULBACTAM SODIUM 1.5 G: 1; .5 INJECTION, POWDER, FOR SOLUTION INTRAMUSCULAR; INTRAVENOUS at 20:34

## 2018-11-08 RX ADMIN — HYDRALAZINE HYDROCHLORIDE 5 MG: 10 TABLET, FILM COATED ORAL at 16:36

## 2018-11-08 RX ADMIN — CARVEDILOL 25 MG: 25 TABLET, FILM COATED ORAL at 12:35

## 2018-11-08 RX ADMIN — PANTOPRAZOLE SODIUM 40 MG: 40 INJECTION, POWDER, FOR SOLUTION INTRAVENOUS at 07:59

## 2018-11-08 RX ADMIN — HYDRALAZINE HYDROCHLORIDE 5 MG: 10 TABLET, FILM COATED ORAL at 20:32

## 2018-11-08 ASSESSMENT — PAIN SCALES - GENERAL
PAINLEVEL_OUTOF10: 0

## 2018-11-08 NOTE — PROGRESS NOTES
Renal Progress Note    Patient :  Chetan Emanuel; 66 y.o. MRN# [id number]  Location:  [facility]/3022-45  Attending:  Zenia Campoverde MD  Admit Date:  11/3/2018   Hospital Day: 5      Subjective:     Oral intake good. Feels well. Urine output excellent. Some shortness of breath on exertion. No dyspnea at rest.  Blood pressures still somewhat on the high side. Ranging between 848-839 systolic range. Has been started back on his home medications. Creatinine seems to be improving down to 3.3 now. Tells me has resistant hypertension for more than 10 years now. Has been on 3 medications including a diuretic without adequate blood pressure control. Lab work has shown hypokalemia with potassium and 3-3.5 range. Also has had alkalosis with bicarbonate 30-32 range going back to 2015. Baseline creatinine has been in the 1.3 range as of 2015. Ultrasound scan showed kidney size to be 12 cm. Workup for paraprotein disease negative. Echocardiogram on admission showed an ejection fraction of 15%.   Outpatient Medications:     Prescriptions Prior to Admission: aspirin 81 MG EC tablet, Take 81 mg by mouth daily  Cyanocobalamin (B-12) 1000 MCG TABS, Take 1 tablet by mouth daily  Riboflavin (B-2) 100 MG TABS, Take 1 tablet by mouth daily  losartan-hydrochlorothiazide (HYZAAR) 100-25 MG per tablet, Take 1 tablet by mouth daily  folic acid (FOLVITE) 079 MCG tablet, Take 800 mcg by mouth daily  ranitidine (RANITIDINE 150 MAX STRENGTH) 150 MG tablet, Take 150 mg by mouth 2 times daily as needed for Heartburn  Ferrous Gluconate (IRON 27 PO), Take 1 tablet by mouth daily  amLODIPine (NORVASC) 10 MG tablet, Take 10 mg by mouth daily  carvedilol (COREG) 25 MG tablet, Take 25 mg by mouth 2 times daily (with meals)    Current Medications:     Scheduled Meds:    carvedilol  25 mg Oral BID    amLODIPine  5 mg Oral Daily    hydrALAZINE  5 mg Oral 3 times per day    isosorbide dinitrate  5 mg Oral TID    ampicillin-sulbactam  1.5 g Intravenous Rest of the workup for resistant hypertension can be done as an outpatient. 6.  If cardiac catheterization is done to look at the RCA at the later point would recommend renal angiogram as well to rule out renal artery stenosis. Due to his body habitus I will not order a renal artery duplex scan as results are not going to be reliable. 7.  Follow renal function expected recover further. Nutrition   Please ensure that patient is on a renal diet/TF. Avoid nephrotoxic drugs/contrast exposure. We will continue to follow along with you.

## 2018-11-08 NOTE — PROGRESS NOTES
PULMONARY & CRITICAL CARE MEDICINE PROGRESS  NOTE     Patient:  Ingrid Abraham  MRN: [id number]  Admit date: 11/3/2018    Reason for Initial Consult:  resuscitated cardiac arrest    SUBJECTIVE     I personally interviewed/examined the patient; reviewed interval history, interpreted all available radiographic, laboratory data at the time of service. Brief Hospital Course and Interval History:  Patient was admitted with  V. Fib cardiac arrest secondary to acute anteroseptal myocardial infarction. He underwent emergent PCI with ROSALIA to LAD and D1. He is status post hypothermia protocol and was extubated 2 days back. He continues to do well from a respiratory standpoint and is currently on room air. He  Is morbidly obese. No other issues reported    Problem List:  Patient Active Problem List   Diagnosis    Cardiac arrest [facility])    [facility]ricular fibrillation ([facility] [address]    Aspiration pneumonia of both upper lobes ([facility] [address]    Bandemia       Review of Systems:  A 10 point review of system was performed and was negative except as described in HPI    OBJECTIVE     Vitals:   BP (!) 142/86   Pulse 80   Temp 97.6 °F (36.4 °C) (Oral)   Resp 25   Ht 5' 11\" (1.803 m)   Wt (!) 327 lb 6.1 oz (148.5 kg)   SpO2 95%   BMI 45.66 kg/m²     Systemic Examination:   General appearance - alert, well appearing, and in no distress, morbidly obese  Mental status - alert, oriented to person, place, and time  Eyes - pupils equal and reactive, extraocular eye movements intact  Mouth - mucous membranes moist, pharynx normal without lesions  Neck - supple, no significant adenopathy  Chest - Chest was symmetrical without dullness to percussion. Breath sounds slightly diminished at the bases. There were no wheezes, rhonchi or rales.   There is no intercostal recession or use of accessory muscles  Heart - normal rate, regular rhythm, normal S1, S2, no murmurs, rubs, clicks or = values in this interval not displayed. Input/Output:    Intake/Output Summary (Last 24 hours) at 11/08/18 2208  Last data filed at 11/08/18 1300   Gross per 24 hour   Intake             1075 ml   Output             2075 ml   Net            -1000 ml       Microbiology:  MRSA in the sputum culture    Pathology:    Radiology:    Chest X-ray:  Impression   No evidence of acute cardiopulmonary disease. CT Scans:  Impression   Negative CTA for pulmonary embolus.       NG tube extends into the airway terminating in the bronchus intermedius. Repositioning is recommended.  This finding was discussed with the patient's   nurse [place] on 11/03/2018 at 2340 hours.       Bilateral lung infiltrates questioning aspiration.       Bilateral anterolateral rib fractures likely related to CPR.     Left renal cyst.       Gallstone. Echocardiogram:  Summary  Mild concentric left ventricular hypertrophy. Evidence of diastolic dysfunction. Severely reduced left ventricular function. Ejection fraction by Curtis's measured 13%. No apical thrombus noted. Right ventricle is normal in size with reduced function. No significant pericardial effusion is seen.     ASSESSMENT AND PLAN     Assessment:    // Acute respiratory failure, status post extubation  // Resuscitated V. fib cardiac arrest  // Acute anteroseptal MI, status post PCI with ROSALIA to LAD and diagonal  // Ischemic cardiomyopathy with severe LV systolic dysfunction  // Status post hypothermia protocol  // Suspected aspiration pneumonia    Plan:    Patient remains hemodynamically stable and is currently saturating well on room air  Continue supplemental oxygen to keep oxygen saturation greater than 92%  Continue as needed noninvasive mechanical ventilation (BiPAP)  Antiplatelets, beta-blocker, statins therapy as per cardiology  Encourage incentive spirometry, continue pulmonary toilet, aspiration precautions and bronchodilators  Continue to monitor I/O with a goal

## 2018-11-08 NOTE — PROGRESS NOTES
--   98   --   108*    < > = values in this interval not displayed. Hepatic:   No results for input(s): AST, ALT, ALB, BILITOT, ALKPHOS in the last 72 hours. Troponin: No results for input(s): TROPONINI in the last 72 hours. No results for input(s): TROPONINT in the last 72 hours. BNP: No results for input(s): PROBNP in the last 72 hours. No results for input(s): BNP in the last 72 hours. Lipids:   No results for input(s): CHOL, HDL in the last 72 hours. Invalid input(s): LDLCALCU  INR:   No results for input(s): INR in the last 72 hours. Objective:   Vitals: BP (!) 142/86   Pulse 80   Temp 97.6 °F (36.4 °C) (Oral)   Resp 25   Ht 5' 11\" (1.803 m)   Wt (!) 327 lb 6.1 oz (148.5 kg)   SpO2 95%   BMI 45.66 kg/m²       Constitutional and General Appearance: in chair  Eyes:  sclera anicteric, left eye normal, right eye normal  Cardiovascular:  regular rate and rhythm, S1, S2 normal, no S3 or S4, no click and no rub  Respiratory:  b/l air entry present. Gastrointestinal:  soft, obese  Extremities: No calf assymetry. Lower extremity edema none bilateral. Groin site on the right intact without any bleeding. Musculoskeletal:  no joint tenderness, deformity or swelling, no muscular tenderness noted. Skin:  dry and anicteric. Neurologic:  not examined; paralyzed. Psychiatric:  Intubated and sedated. Diagnostic Studies:     EKG 11/3 : SR with 1st degree AV block. Anteroseptal injury pattern. IVCD. ECHO: 11/4  Mild concentric left ventricular hypertrophy. Evidence of diastolic dysfunction. Severely reduced left ventricular function. Ejection fraction by Curtis's measured 13%    CTa chest: 11/3  Negative CTA for pulmonary embolus. NG tube extends into the airway terminated in the bronchus intermedius.    Repositioning is recommended.  This finding was discussed with the patient's   nurse Khalida Nixon on 11/03/2018 at 2340 hours   Bilateral lung infiltrates questioning aspiration

## 2018-11-08 NOTE — CONSULTS
Inpatient consult to PM&R - Physiatry  Consult performed by: Brittny Garcia ordered by: Dianne Del Rio        Physical Medicine & Rehabilitation  Consult Note      Admitting Physician: Tenisha Sanchez MD    Primary Care Provider: Robert Forman MD     Reason for Consult:  Acute Inpatient Rehabilitation    Chief Complaint: MI    History of Present Illness:  Referring Provider is requesting an evaluation for appropriate placement upon discharge from acute care. Mr. Jillian Atkins is a 66 y.o. right handed male who was admitted to [facility] on 11/3/2018 with Cardiac Arrest (pt. arrives LS1 v-fib arrest, CPR in progress, 2 epi, 6 shocks, 300mg and 150mg of amiodarone and 2g Mg PTA)     70-year-old male was at a hockey game had witnessed cardiac arrest recessed in ER. He was found have V. fib started on amiodarone CTA of the chest was negative. Cardiac cath was not diagnostic. Hypothermia protocol was started. He was initially intubated eventually extubated on 11/6. Infectious disease-leukocytosis/fever-bilateral aspiration pneumonia, continue Unasyn  Nephrology-acute kidney injury secondary to ischemic and nephro toxic ATN from cardiac respiratory arrest and contrast exposure, creatinine 3.7-improving to 3.3, blood pressure medications being adjusted workup for resistant hypertension to be done as outpatient  Cardiology-V. fib arrest, anterior septal ST MI status post PCI of the LAD and diagonal with severe stenosis residual ischemic cardiomyopathy ejection fraction 13%, continue aspirin no Ace, air be due to acute kidney injury, will need life vest, EP evaluation for AICD      ECHO: 11/4  Mild concentric left ventricular hypertrophy. Evidence of diastolic dysfunction. Severely reduced left ventricular function. Ejection fraction by Curtis's measured 13%     CTa chest: 11/3  Negative CTA for pulmonary embolus. NG tube extends into the airway terminated in the bronchus intermedius. Repositioning is recommended.  This finding was discussed with the patient's   nurse Olive Parra on 11/03/2018 at 2340 hours   Bilateral lung infiltrates questioning aspiration   Bilateral anterolateral rib fractures likely related to the CPR.       Cath 11/3/18  LMCA: Normal 0% stenosis. LAD: Proximal- mid 99%, mid 90% and distal 100% stenosis. D1: 100% stenosis  LCx: Dominant. Minimal 25% stenosis. OPm1: Mid 40% stenosis. OM2: 20% stenosis  PDA: Minimal 20% stenosis. RCA: Non dominant, Ostial 99% and mid 50% stenosis     Successful PTCA -ROSALIA LAD, distal, mid and proximal-mid area         Cardiac cath 11/4  Conclusions:  1. Severe LAD and Diagonal diseases s/p successful PCI as above. 2. Residual severe RCA stenosis. 3. Successful placement of Quattro cooling catheter      Review of Systems:  Constitutional: negative for anorexia, chills, fatigue, fevers, sweats and weight loss  Eyes: negative for redness and visual disturbance  Ears, nose, mouth, throat, and face: negative for earaches, sore throat and tinnitus  Respiratory: negative for cough and shortness of breath  Cardiovascular: negative for chest pain, dyspnea and palpitations  Gastrointestinal: negative for abdominal pain, change in bowel habits, constipation, nausea and vomiting  Genitourinary:negative for dysuria, frequency, hesitancy and urinary incontinence  Integument/breast: negative for pruritus and rash  Musculoskeletal:negative for muscle weakness and stiff joints  Neurological: negative for dizziness, headaches and weakness  Behavioral/Psych: negative for decreased appetite, depression and fatigue    Functional History:  PTA: Independent with all activities. Current:  PT:  Restrictions/Precautions: Fall Risk, Up as Tolerated    Transfers  Sit to Stand: Mod. A x 1  Stand to sit: Mod. A x 1  Ambulation  Ambulation?: Yes  Ambulation 1  Surface: level tile  Device: RW  Other Apparatus: O2 (2L )  Assistance: CGA  Quality of Gait: Decreased hip

## 2018-11-09 PROBLEM — I10 RESISTANT HYPERTENSION: Status: ACTIVE | Noted: 2018-11-09

## 2018-11-09 PROBLEM — N18.30 STAGE 3 CHRONIC KIDNEY DISEASE (HCC): Status: ACTIVE | Noted: 2018-11-09

## 2018-11-09 PROBLEM — N17.9 AKI (ACUTE KIDNEY INJURY) (HCC): Status: ACTIVE | Noted: 2018-11-09

## 2018-11-09 LAB
ANION GAP SERPL CALCULATED.3IONS-SCNC: 12 MMOL/L (ref 9–17)
BUN BLDV-MCNC: 39 MG/DL (ref 6–20)
BUN/CREAT BLD: ABNORMAL (ref 9–20)
CALCIUM SERPL-MCNC: 8.5 MG/DL (ref 8.6–10.4)
CHLORIDE BLD-SCNC: 108 MMOL/L (ref 98–107)
CO2: 21 MMOL/L (ref 20–31)
CREAT SERPL-MCNC: 2.99 MG/DL (ref 0.7–1.2)
GFR AFRICAN AMERICAN: 26 ML/MIN
GFR NON-AFRICAN AMERICAN: 22 ML/MIN
GFR SERPL CREATININE-BSD FRML MDRD: ABNORMAL ML/MIN/{1.73_M2}
GFR SERPL CREATININE-BSD FRML MDRD: ABNORMAL ML/MIN/{1.73_M2}
GLUCOSE BLD-MCNC: 101 MG/DL (ref 75–110)
GLUCOSE BLD-MCNC: 105 MG/DL (ref 75–110)
GLUCOSE BLD-MCNC: 111 MG/DL (ref 75–110)
GLUCOSE BLD-MCNC: 158 MG/DL (ref 70–99)
GLUCOSE BLD-MCNC: 165 MG/DL (ref 75–110)
GLUCOSE BLD-MCNC: 87 MG/DL (ref 75–110)
POTASSIUM SERPL-SCNC: 3.3 MMOL/L (ref 3.7–5.3)
SODIUM BLD-SCNC: 141 MMOL/L (ref 135–144)

## 2018-11-09 PROCEDURE — 6360000002 HC RX W HCPCS: Performed by: INTERNAL MEDICINE

## 2018-11-09 PROCEDURE — 99232 SBSQ HOSP IP/OBS MODERATE 35: CPT | Performed by: INTERNAL MEDICINE

## 2018-11-09 PROCEDURE — 82947 ASSAY GLUCOSE BLOOD QUANT: CPT

## 2018-11-09 PROCEDURE — 6370000000 HC RX 637 (ALT 250 FOR IP): Performed by: INTERNAL MEDICINE

## 2018-11-09 PROCEDURE — 2060000000 HC ICU INTERMEDIATE R&B

## 2018-11-09 PROCEDURE — 6370000000 HC RX 637 (ALT 250 FOR IP): Performed by: STUDENT IN AN ORGANIZED HEALTH CARE EDUCATION/TRAINING PROGRAM

## 2018-11-09 PROCEDURE — 80048 BASIC METABOLIC PNL TOTAL CA: CPT

## 2018-11-09 PROCEDURE — 2580000003 HC RX 258: Performed by: INTERNAL MEDICINE

## 2018-11-09 PROCEDURE — 97530 THERAPEUTIC ACTIVITIES: CPT

## 2018-11-09 PROCEDURE — 36415 COLL VENOUS BLD VENIPUNCTURE: CPT

## 2018-11-09 PROCEDURE — C9113 INJ PANTOPRAZOLE SODIUM, VIA: HCPCS | Performed by: INTERNAL MEDICINE

## 2018-11-09 RX ORDER — BENZONATATE 100 MG/1
100 CAPSULE ORAL 3 TIMES DAILY PRN
Status: DISCONTINUED | OUTPATIENT
Start: 2018-11-09 | End: 2018-11-10 | Stop reason: HOSPADM

## 2018-11-09 RX ORDER — SPIRONOLACTONE 25 MG/1
25 TABLET ORAL ONCE
Status: COMPLETED | OUTPATIENT
Start: 2018-11-09 | End: 2018-11-09

## 2018-11-09 RX ADMIN — BENZONATATE 100 MG: 100 CAPSULE ORAL at 06:41

## 2018-11-09 RX ADMIN — PANTOPRAZOLE SODIUM 40 MG: 40 INJECTION, POWDER, FOR SOLUTION INTRAVENOUS at 09:07

## 2018-11-09 RX ADMIN — TICAGRELOR 90 MG: 90 TABLET ORAL at 21:03

## 2018-11-09 RX ADMIN — POTASSIUM CHLORIDE 40 MEQ: 20 TABLET, EXTENDED RELEASE ORAL at 13:44

## 2018-11-09 RX ADMIN — HYDRALAZINE HYDROCHLORIDE 5 MG: 10 TABLET, FILM COATED ORAL at 21:03

## 2018-11-09 RX ADMIN — TICAGRELOR 90 MG: 90 TABLET ORAL at 09:07

## 2018-11-09 RX ADMIN — CARVEDILOL 25 MG: 25 TABLET, FILM COATED ORAL at 09:08

## 2018-11-09 RX ADMIN — AMPICILLIN SODIUM AND SULBACTAM SODIUM 1.5 G: 1; .5 INJECTION, POWDER, FOR SOLUTION INTRAMUSCULAR; INTRAVENOUS at 19:10

## 2018-11-09 RX ADMIN — ISOSORBIDE DINITRATE 5 MG: 10 TABLET ORAL at 13:47

## 2018-11-09 RX ADMIN — ISOSORBIDE DINITRATE 5 MG: 10 TABLET ORAL at 09:07

## 2018-11-09 RX ADMIN — HYDRALAZINE HYDROCHLORIDE 5 MG: 10 TABLET, FILM COATED ORAL at 13:46

## 2018-11-09 RX ADMIN — HYDRALAZINE HYDROCHLORIDE 5 MG: 10 TABLET, FILM COATED ORAL at 06:43

## 2018-11-09 RX ADMIN — BENZONATATE 100 MG: 100 CAPSULE ORAL at 18:16

## 2018-11-09 RX ADMIN — ASPIRIN 81 MG: 81 TABLET, CHEWABLE ORAL at 09:08

## 2018-11-09 RX ADMIN — DIPHENHYDRAMINE HCL 25 MG: 25 TABLET ORAL at 21:07

## 2018-11-09 RX ADMIN — AMLODIPINE BESYLATE 5 MG: 5 TABLET ORAL at 12:13

## 2018-11-09 RX ADMIN — ATORVASTATIN CALCIUM 80 MG: 80 TABLET, FILM COATED ORAL at 21:03

## 2018-11-09 RX ADMIN — ISOSORBIDE DINITRATE 5 MG: 10 TABLET ORAL at 21:03

## 2018-11-09 RX ADMIN — SPIRONOLACTONE 25 MG: 25 TABLET ORAL at 12:13

## 2018-11-09 RX ADMIN — Medication 10 ML: at 09:07

## 2018-11-09 RX ADMIN — AMPICILLIN SODIUM AND SULBACTAM SODIUM 1.5 G: 1; .5 INJECTION, POWDER, FOR SOLUTION INTRAMUSCULAR; INTRAVENOUS at 04:11

## 2018-11-09 RX ADMIN — CARVEDILOL 25 MG: 25 TABLET, FILM COATED ORAL at 21:03

## 2018-11-09 ASSESSMENT — ENCOUNTER SYMPTOMS
COUGH: 1
GASTROINTESTINAL NEGATIVE: 1
ALLERGIC/IMMUNOLOGIC NEGATIVE: 1
EYES NEGATIVE: 1

## 2018-11-09 NOTE — PROGRESS NOTES
Daily    hydrALAZINE  5 mg Oral 3 times per day    isosorbide dinitrate  5 mg Oral TID    ampicillin-sulbactam  1.5 g Intravenous Q6H    sodium chloride flush  10 mL Intravenous 2 times per day    atorvastatin  80 mg Oral Nightly    aspirin  81 mg Oral Daily    ticagrelor  90 mg Oral BID    insulin lispro  0-6 Units Subcutaneous 4x Daily AC & HS    insulin lispro  0-3 Units Subcutaneous Nightly    influenza virus vaccine  0.5 mL Intramuscular Once    pantoprazole  40 mg Intravenous Daily    And    sodium chloride (PF)  10 mL Intravenous Daily     Continuous Infusions:    dextrose       PRN Meds:  benzonatate, metoprolol, albuterol sulfate HFA, potassium chloride **OR** potassium chloride **OR** potassium chloride, diphenhydrAMINE, sodium chloride flush, acetaminophen, magnesium hydroxide, docusate sodium, ondansetron, glucose, dextrose, glucagon (rDNA), dextrose, fentanNYL, magnesium sulfate, sodium phosphate IVPB **OR** sodium phosphate IVPB    Input/Output:       I/O last 3 completed shifts: In: 1108 [P.O.:240; I.V.:868]  Out: 675 [Urine:675]. Patient Vitals for the past 96 hrs (Last 3 readings):   Weight   18 0000 (!) 327 lb 6.1 oz (148.5 kg)   18 0600 (!) 328 lb 14.4 oz (149.2 kg)   18 0600 (!) 331 lb (150.1 kg)       Vital Signs:   Temperature:  Temp: 98.1 °F (36.7 °C)  TMax:   Temp (24hrs), Av.2 °F (36.8 °C), Min:97.6 °F (36.4 °C), Max:98.8 °F (37.1 °C)    Respirations:  Resp: 18  Pulse:   Pulse: 72  BP:    BP: (!) 160/68  BP Range: Systolic (74NMT), IDU:247 , Min:133 , SVR:310       Diastolic (92MHU), UYH:21, Min:62, Max:86      Physical Examination:     General:  AAO x 3, speaking in full sentences, no accessory muscle use. HEENT: Atraumatic, normocephalic, no throat congestion, moist mucosa. Eyes:   Pupils equal, round and reactive to light, EOMI. Neck:   No JVD, no thyromegaly, no lymphadenopathy.   Chest:  Bilateral vesicular breath sounds, no rales or

## 2018-11-09 NOTE — PROGRESS NOTES
[facility])     Ventricular fibrillation ([facility] [address]     Aspiration pneumonia of both upper lobes ([facility] [address]     Bandemia      Plan of Treatment:   1. Doing well. Has LV on. Discussed in detail with patient and wife post-cath POC including but not limited to follow-up, medications, Lifevest, repeat echo in 40days from event, and cardiac rehab. Questions and concerns addressed in detail. Continue PO ASA, statin, BB, Imdur, & Brilinta. No ACE/ARB at this time d/t CARMINE  2. Appreciate neprho input. AM labs not yet up. Await renal improvement prior to discharge. ARB started yesterday for HTN.  Follow    Electronically signed by MAIN Knott CNP on 11/9/2018 at 1818 [facility].  [phone]

## 2018-11-09 NOTE — PROGRESS NOTES
[facility] - InitialConsult Note  admission date 11/3/2018  Impression :   Current:  · V fib and acute card arrest resuscitated  · bilat UL aspiration pneumonia MSSA  · resp failure extubated 11/6/18  · Leukocytosis  ·       Other:  ·    Discussion / summary of stay / plan of care   ·    Recommendations   · unasyn day 5/ 7 till 11/11/18  · can switch to po Augmentin upon discharge till 11/11/18  · Fever and Leukocytosis improved  · Respiratory toilet  · Aspiration precautions  · Case discussed with patient and nurse     Infection Control Recommendations   · [place] Precautions  · Contact Isolation      Antimicrobial Stewardship Recommendations   · Simplification of therapy  · Targeted therapy        Coordination ofOutpatient Care:   · Estimated Length of IV antimicrobials:  · Patient will need Midline / picc Catheter Insertion:   · Patient will need SNF:  · Patient will need outpatient wound care:   Chief complaint/reason for consultation:   Card arrest - fever     History of Present Illness:   Initial history:  Lobito Dhillon is a 66y.o.-year-old male was in the hockey game and was witnessed to have a cardiac arrest, resuscitated and brought to the ER. In the emergency room he was found to have V. fib, started on amiodarone, CTA of the chest negative. He had a cardiac cath that wasn't diagnostic. Hypothermia protocol was started. Currently extubated, alert, appropriate. Seems tired, he is warm with a cooling blanket admitted temperature 38 5 at this time. I'll diabetes are within normal range. He has no abdominal pain.   Now extubated OP x 3  Fever 11/5 and 11/6 and blood cx taken  We are called  WBC 14=15=18  Started on unasyn 11/5/18        Interval gwxhaol62/6/2018   No fever and cough better - white phlem  abd obese and having BMs  No rash  WBC normal     Summary of relevant labs:  Labs:  W 14-15-18 - 11  Micro:  blood culture x 2 11/6  U cx 116   Sputum cx staph aureus 11/5/18 MSSA  Imaging:  Chest x-ray 11/7. No active pulmonary disease  Renal US suboptimal and neg 11/4/18  CT chest 11/3/18 neg PE, possible bilat UL lung infiltrates and ? aspiration                I have personally reviewed the past medical history, past surgical history, medications, social history, and family history, and I haveupdated the database accordingly. Past Medical History:     Past Medical History:   Diagnosis Date    CARMINE (acute kidney injury) ([facility] [address] 11/9/2018    Post cardiac arrest and from contrast exposure. Baseline 1.5 which peaked up to 3.9, resolving    Hypertension     Resistant hypertension 11/9/2018    Suboptimal control despite being on 3 medications including diuretics. Potassium level tends to run low and also has had alkalosis based on previous lab values. Aldosterone renin ratio sent.   Aldactone started this admission    Stage 3 chronic kidney disease ([facility] [address] 11/9/2018    From ischemic nephrosclerosis and cardiorenal syndrome baseline 1.3-1.5       Past Surgical  History:     Past Surgical History:   Procedure Laterality Date    HERNIA REPAIR Left 2008    HERNIA REPAIR         Medications:      carvedilol  25 mg Oral BID    amLODIPine  5 mg Oral Daily    hydrALAZINE  5 mg Oral 3 times per day    isosorbide dinitrate  5 mg Oral TID    ampicillin-sulbactam  1.5 g Intravenous Q6H    sodium chloride flush  10 mL Intravenous 2 times per day    atorvastatin  80 mg Oral Nightly    aspirin  81 mg Oral Daily    ticagrelor  90 mg Oral BID    insulin lispro  0-6 Units Subcutaneous 4x Daily AC & HS    insulin lispro  0-3 Units Subcutaneous Nightly    influenza virus vaccine  0.5 mL Intramuscular Once    pantoprazole  40 mg Intravenous Daily    And    sodium chloride (PF)  10 mL Intravenous Daily       Social History:     Social History     Social History    Marital status:      Spouse name: N/A    Number of children: N/A    Years of education: N/A

## 2018-11-09 NOTE — PLAN OF CARE
Problem: Risk for Impaired Skin Integrity  Goal: Tissue integrity - skin and mucous membranes  Structural intactness and normal physiological function of skin and  mucous membranes.    Outcome: Ongoing      Problem: SAFETY  Goal: Free from accidental physical injury  Outcome: Ongoing      Problem: Pain:  Goal: Control of acute pain  Control of acute pain   Outcome: Ongoing

## 2018-11-10 VITALS
HEART RATE: 70 BPM | TEMPERATURE: 97.7 F | DIASTOLIC BLOOD PRESSURE: 79 MMHG | RESPIRATION RATE: 16 BRPM | WEIGHT: 315 LBS | SYSTOLIC BLOOD PRESSURE: 158 MMHG | HEIGHT: 71 IN | BODY MASS INDEX: 44.1 KG/M2 | OXYGEN SATURATION: 97 %

## 2018-11-10 LAB
ALDOSTERONE COMMENT: NORMAL
ALDOSTERONE: 14.8 NG/DL
ANION GAP SERPL CALCULATED.3IONS-SCNC: 14 MMOL/L (ref 9–17)
BUN BLDV-MCNC: 39 MG/DL (ref 6–20)
BUN/CREAT BLD: ABNORMAL (ref 9–20)
CALCIUM SERPL-MCNC: 8.8 MG/DL (ref 8.6–10.4)
CHLORIDE BLD-SCNC: 104 MMOL/L (ref 98–107)
CO2: 20 MMOL/L (ref 20–31)
CREAT SERPL-MCNC: 2.61 MG/DL (ref 0.7–1.2)
GFR AFRICAN AMERICAN: 31 ML/MIN
GFR NON-AFRICAN AMERICAN: 26 ML/MIN
GFR SERPL CREATININE-BSD FRML MDRD: ABNORMAL ML/MIN/{1.73_M2}
GFR SERPL CREATININE-BSD FRML MDRD: ABNORMAL ML/MIN/{1.73_M2}
GLUCOSE BLD-MCNC: 103 MG/DL (ref 70–99)
POTASSIUM SERPL-SCNC: 3.5 MMOL/L (ref 3.7–5.3)
RENIN ACTIVITY: 0.7 NG/ML/HR
RENIN COMMENT: NORMAL
SODIUM BLD-SCNC: 138 MMOL/L (ref 135–144)

## 2018-11-10 PROCEDURE — 6370000000 HC RX 637 (ALT 250 FOR IP): Performed by: INTERNAL MEDICINE

## 2018-11-10 PROCEDURE — 2580000003 HC RX 258: Performed by: INTERNAL MEDICINE

## 2018-11-10 PROCEDURE — C9113 INJ PANTOPRAZOLE SODIUM, VIA: HCPCS | Performed by: INTERNAL MEDICINE

## 2018-11-10 PROCEDURE — 6360000002 HC RX W HCPCS: Performed by: INTERNAL MEDICINE

## 2018-11-10 PROCEDURE — 36415 COLL VENOUS BLD VENIPUNCTURE: CPT

## 2018-11-10 PROCEDURE — 80048 BASIC METABOLIC PNL TOTAL CA: CPT

## 2018-11-10 PROCEDURE — 99232 SBSQ HOSP IP/OBS MODERATE 35: CPT | Performed by: INTERNAL MEDICINE

## 2018-11-10 RX ORDER — ALBUTEROL SULFATE 90 UG/1
2 AEROSOL, METERED RESPIRATORY (INHALATION) EVERY 6 HOURS PRN
Qty: 1 INHALER | Refills: 3 | Status: SHIPPED | OUTPATIENT
Start: 2018-11-10

## 2018-11-10 RX ORDER — HYDRALAZINE HYDROCHLORIDE 25 MG/1
25 TABLET, FILM COATED ORAL 3 TIMES DAILY
Qty: 90 TABLET | Refills: 3 | Status: SHIPPED | OUTPATIENT
Start: 2018-11-10

## 2018-11-10 RX ORDER — OMEPRAZOLE 20 MG/1
20 CAPSULE, DELAYED RELEASE ORAL DAILY
Qty: 30 CAPSULE | Refills: 3 | Status: SHIPPED | OUTPATIENT
Start: 2018-11-10

## 2018-11-10 RX ORDER — ISOSORBIDE MONONITRATE 30 MG/1
30 TABLET, EXTENDED RELEASE ORAL DAILY
Qty: 30 TABLET | Refills: 3 | Status: SHIPPED | OUTPATIENT
Start: 2018-11-10

## 2018-11-10 RX ORDER — ATORVASTATIN CALCIUM 80 MG/1
80 TABLET, FILM COATED ORAL NIGHTLY
Qty: 30 TABLET | Refills: 3 | Status: SHIPPED | OUTPATIENT
Start: 2018-11-10

## 2018-11-10 RX ADMIN — HYDRALAZINE HYDROCHLORIDE 5 MG: 10 TABLET, FILM COATED ORAL at 08:49

## 2018-11-10 RX ADMIN — PANTOPRAZOLE SODIUM 40 MG: 40 INJECTION, POWDER, FOR SOLUTION INTRAVENOUS at 09:35

## 2018-11-10 RX ADMIN — ISOSORBIDE DINITRATE 5 MG: 10 TABLET ORAL at 09:35

## 2018-11-10 RX ADMIN — CARVEDILOL 25 MG: 25 TABLET, FILM COATED ORAL at 09:35

## 2018-11-10 RX ADMIN — ASPIRIN 81 MG: 81 TABLET, CHEWABLE ORAL at 09:35

## 2018-11-10 RX ADMIN — AMPICILLIN SODIUM AND SULBACTAM SODIUM 1.5 G: 1; .5 INJECTION, POWDER, FOR SOLUTION INTRAMUSCULAR; INTRAVENOUS at 02:40

## 2018-11-10 RX ADMIN — AMLODIPINE BESYLATE 5 MG: 5 TABLET ORAL at 09:35

## 2018-11-10 RX ADMIN — TICAGRELOR 90 MG: 90 TABLET ORAL at 09:35

## 2018-11-10 RX ADMIN — HYDRALAZINE HYDROCHLORIDE 5 MG: 10 TABLET, FILM COATED ORAL at 14:13

## 2018-11-10 ASSESSMENT — ENCOUNTER SYMPTOMS
SHORTNESS OF BREATH: 0
COUGH: 1
GASTROINTESTINAL NEGATIVE: 1
CHOKING: 0
EYES NEGATIVE: 1
CHEST TIGHTNESS: 0
APNEA: 0

## 2018-11-10 NOTE — PROGRESS NOTES
Renal Progress Note    Patient :  Brenda Meade; 66 y.o. MRN# [id number]  Location:  82/4351-93  Attending:  Scar Paez MD  Admit Date:  11/3/2018   Hospital Day: 7      Subjective:     Oral intake good. Feels well. Urine output excellent. Some shortness of breath on exertion. No dyspnea at rest.  Blood pressures still somewhat on the high side. Ranging between 374-856 systolic range. Has been started back on his home medications including aldactone. Creatinine seems to be improving down to 2.6 today . Baseline creatinine has been in the 1.3 range as of 2015. Ultrasound scan showed kidney size to be 12 cm. Workup for paraprotein disease negative. Echocardiogram on admission showed an ejection fraction of 15%. Samira Yair for d/c  Had Aldosterone and renin levels drawn , pending .     Outpatient Medications:     Prescriptions Prior to Admission: aspirin 81 MG EC tablet, Take 81 mg by mouth daily  Cyanocobalamin (B-12) 1000 MCG TABS, Take 1 tablet by mouth daily  Riboflavin (B-2) 100 MG TABS, Take 1 tablet by mouth daily  losartan-hydrochlorothiazide (HYZAAR) 100-25 MG per tablet, Take 1 tablet by mouth daily  folic acid (FOLVITE) 616 MCG tablet, Take 800 mcg by mouth daily  ranitidine (RANITIDINE 150 MAX STRENGTH) 150 MG tablet, Take 150 mg by mouth 2 times daily as needed for Heartburn  Ferrous Gluconate (IRON 27 PO), Take 1 tablet by mouth daily  amLODIPine (NORVASC) 10 MG tablet, Take 10 mg by mouth daily  carvedilol (COREG) 25 MG tablet, Take 25 mg by mouth 2 times daily (with meals)    Current Medications:     Scheduled Meds:    carvedilol  25 mg Oral BID    amLODIPine  5 mg Oral Daily    hydrALAZINE  5 mg Oral 3 times per day    isosorbide dinitrate  5 mg Oral TID    ampicillin-sulbactam  1.5 g Intravenous Q6H    sodium chloride flush  10 mL Intravenous 2 times per day    atorvastatin  80 mg Oral Nightly    aspirin  81 mg Oral Daily    ticagrelor  90 mg Oral BID    insulin lispro  0-6 Units

## 2018-11-10 NOTE — PROGRESS NOTES
supple, no significant adenopathy  Chest - Chest was symmetrical without dullness to percussion. Breath sounds clear to auscultation. There were no wheezes, rhonchi or rales.   There is no intercostal recession or use of accessory muscles  Heart - normal rate, regular rhythm, normal S1, S2, no murmurs, rubs, clicks or gallops  Abdomen - soft, nontender, nondistended, no masses or organomegaly  Neurological - alert, oriented, normal speech, no focal findings or movement disorder noted}  Extremities - peripheral pulses normal, no pedal edema, no clubbing or cyanosis  Skin - normal coloration and turgor, no rashes, no suspicious skin lesions noted     DATA REVIEW     Allergies:    No Known Allergies      Medications:  Scheduled Meds:   carvedilol  25 mg Oral BID    amLODIPine  5 mg Oral Daily    hydrALAZINE  5 mg Oral 3 times per day    isosorbide dinitrate  5 mg Oral TID    ampicillin-sulbactam  1.5 g Intravenous Q6H    sodium chloride flush  10 mL Intravenous 2 times per day    atorvastatin  80 mg Oral Nightly    aspirin  81 mg Oral Daily    ticagrelor  90 mg Oral BID    insulin lispro  0-6 Units Subcutaneous 4x Daily AC & HS    insulin lispro  0-3 Units Subcutaneous Nightly    influenza virus vaccine  0.5 mL Intramuscular Once    pantoprazole  40 mg Intravenous Daily    And    sodium chloride (PF)  10 mL Intravenous Daily     Continuous Infusions:   dextrose         LABS:-  ABGs:   Lab Results   Component Value Date    POCPH 7.462 (H) 11/06/2018    POCPCO2 33.5 (L) 11/06/2018    POCPO2 94.5 11/06/2018    POCHCO3 23.9 11/06/2018    CFRE6OGP 98 11/06/2018     CBC:   Recent Labs      11/07/18   2233  11/08/18   0457   WBC  11.3  10.9   HGB  10.0*  10.2*   HCT  30.5*  31.7*   MCV  88.7  89.5   PLT  174  180   LYMPHOPCT  13*  13*   RBC  3.44*  3.54*   MCH  29.1  28.8   MCHC  32.8  32.2   RDW  13.3  13.3     BMP:   Recent Labs      11/08/18   0457  11/09/18   1216  11/10/18   0553   NA  139  141  138   K

## 2018-11-10 NOTE — PROGRESS NOTES
PULMONARY & CRITICAL CARE MEDICINE PROGRESS  NOTE     Patient:  Brittney Carrero  MRN: [id number]  Admit date: 11/3/2018    Reason for Initial Consult:  resuscitated cardiac arrest    SUBJECTIVE     I personally interviewed/examined the patient; reviewed interval history, interpreted all available radiographic, laboratory data at the time of service. Brief Hospital Course and Interval History:  Patient was admitted with  V. Fib cardiac arrest secondary to acute anteroseptal myocardial infarction. He underwent emergent PCI with ROSALIA to LAD and D1. He is status post hypothermia protocol. He continues to do well from a respiratory standpoint and is currently on room air. No overnight issues reported. Patient is eagerly looking forward to discharge. Problem List:  Patient Active Problem List   Diagnosis    Cardiac arrest [facility])    [facility]ricular fibrillation ([facility] [address]    Aspiration pneumonia of both upper lobes ([facility] [address]    Bandemia    CARMINE (acute kidney injury) ([facility] [address]    Stage 3 chronic kidney disease ([facility] [address]    Resistant hypertension       Review of Systems:  A 10 point review of system was performed and was negative except as described in HPI    OBJECTIVE     Vitals:   /68   Pulse 71   Temp 98.8 °F (37.1 °C) (Oral)   Resp 18   Ht 5' 11\" (1.803 m)   Wt (!) 327 lb 6.1 oz (148.5 kg)   SpO2 97%   BMI 45.66 kg/m²     Systemic Examination:   General appearance - alert, well appearing, and in no distress, morbidly obese  Mental status - alert, oriented to person, place, and time  Eyes - pupils equal and reactive, extraocular eye movements intact  Mouth - mucous membranes moist, pharynx normal without lesions  Neck - supple, no significant adenopathy  Chest - Chest was symmetrical without dullness to percussion. Breath sounds clear to auscultation. There were no wheezes, rhonchi or rales.   There is no intercostal recession or use of accessory

## 2018-11-12 LAB
CULTURE: NORMAL
CULTURE: NORMAL
Lab: NORMAL
Lab: NORMAL
SPECIMEN DESCRIPTION: NORMAL
SPECIMEN DESCRIPTION: NORMAL
STATUS: NORMAL
STATUS: NORMAL

## 2020-07-22 ENCOUNTER — HOSPITAL ENCOUNTER (OUTPATIENT)
Age: 60
Setting detail: SPECIMEN
Discharge: HOME | End: 2020-07-22
Payer: COMMERCIAL

## 2020-07-22 LAB
ABSOLUTE EOS #: <0.03 K/UL (ref 0–0.44)
ABSOLUTE IMMATURE GRANULOCYTE: 0.06 K/UL (ref 0–0.3)
ABSOLUTE LYMPH #: 1.71 K/UL (ref 1.1–3.7)
ABSOLUTE MONO #: 0.69 K/UL (ref 0.1–1.2)
ALT SERPL-CCNC: 29 U/L (ref 5–41)
ANION GAP SERPL CALCULATED.3IONS-SCNC: 11 MMOL/L (ref 9–17)
AST SERPL-CCNC: 18 U/L
BASOPHILS # BLD: 1 % (ref 0–2)
BASOPHILS ABSOLUTE: 0.07 K/UL (ref 0–0.2)
BUN BLDV-MCNC: 32 MG/DL (ref 6–20)
BUN/CREAT BLD: ABNORMAL (ref 9–20)
CALCIUM SERPL-MCNC: 9.5 MG/DL (ref 8.6–10.4)
CHLORIDE BLD-SCNC: 104 MMOL/L (ref 98–107)
CHOLESTEROL/HDL RATIO: 4.3
CHOLESTEROL: 134 MG/DL
CO2: 21 MMOL/L (ref 20–31)
CREAT SERPL-MCNC: 2.23 MG/DL (ref 0.7–1.2)
DIFFERENTIAL TYPE: ABNORMAL
EOSINOPHILS RELATIVE PERCENT: 0 % (ref 1–4)
GFR AFRICAN AMERICAN: 37 ML/MIN
GFR NON-AFRICAN AMERICAN: 30 ML/MIN
GFR SERPL CREATININE-BSD FRML MDRD: ABNORMAL ML/MIN/{1.73_M2}
GFR SERPL CREATININE-BSD FRML MDRD: ABNORMAL ML/MIN/{1.73_M2}
GLUCOSE BLD-MCNC: 121 MG/DL (ref 70–99)
HCT VFR BLD CALC: 39.7 % (ref 40.7–50.3)
HDLC SERPL-MCNC: 31 MG/DL
HEMOGLOBIN: 12.5 G/DL (ref 13–17)
IMMATURE GRANULOCYTES: 1 %
LDL CHOLESTEROL: 73 MG/DL (ref 0–130)
LYMPHOCYTES # BLD: 20 % (ref 24–43)
MCH RBC QN AUTO: 29.8 PG (ref 25.2–33.5)
MCHC RBC AUTO-ENTMCNC: 31.5 G/DL (ref 28.4–34.8)
MCV RBC AUTO: 94.5 FL (ref 82.6–102.9)
MONOCYTES # BLD: 8 % (ref 3–12)
NRBC AUTOMATED: 0 PER 100 WBC
PDW BLD-RTO: 12.4 % (ref 11.8–14.4)
PLATELET # BLD: 234 K/UL (ref 138–453)
PLATELET ESTIMATE: ABNORMAL
PMV BLD AUTO: 10.5 FL (ref 8.1–13.5)
POTASSIUM SERPL-SCNC: 5.3 MMOL/L (ref 3.7–5.3)
RBC # BLD: 4.2 M/UL (ref 4.21–5.77)
RBC # BLD: ABNORMAL 10*6/UL
SEG NEUTROPHILS: 70 % (ref 36–65)
SEGMENTED NEUTROPHILS ABSOLUTE COUNT: 6.07 K/UL (ref 1.5–8.1)
SODIUM BLD-SCNC: 136 MMOL/L (ref 135–144)
TRIGL SERPL-MCNC: 148 MG/DL
VLDLC SERPL CALC-MCNC: ABNORMAL MG/DL (ref 1–30)
WBC # BLD: 8.6 K/UL (ref 3.5–11.3)
WBC # BLD: ABNORMAL 10*3/UL
